# Patient Record
Sex: FEMALE | Race: WHITE | NOT HISPANIC OR LATINO
[De-identification: names, ages, dates, MRNs, and addresses within clinical notes are randomized per-mention and may not be internally consistent; named-entity substitution may affect disease eponyms.]

---

## 2017-04-25 ENCOUNTER — APPOINTMENT (OUTPATIENT)
Dept: OBGYN | Facility: CLINIC | Age: 72
End: 2017-04-25
Payer: COMMERCIAL

## 2017-04-25 VITALS
SYSTOLIC BLOOD PRESSURE: 113 MMHG | HEIGHT: 64 IN | BODY MASS INDEX: 28.68 KG/M2 | DIASTOLIC BLOOD PRESSURE: 71 MMHG | HEART RATE: 60 BPM | WEIGHT: 168 LBS

## 2017-04-25 PROCEDURE — G0101: CPT

## 2017-04-25 PROCEDURE — 99397 PER PM REEVAL EST PAT 65+ YR: CPT

## 2017-04-26 LAB — HPV HIGH+LOW RISK DNA PNL CVX: NEGATIVE

## 2017-05-01 LAB — CYTOLOGY CVX/VAG DOC THIN PREP: NORMAL

## 2018-05-01 ENCOUNTER — APPOINTMENT (OUTPATIENT)
Dept: OBGYN | Facility: CLINIC | Age: 73
End: 2018-05-01

## 2018-05-02 ENCOUNTER — RESULT REVIEW (OUTPATIENT)
Age: 73
End: 2018-05-02

## 2018-05-08 ENCOUNTER — APPOINTMENT (OUTPATIENT)
Dept: OBGYN | Facility: CLINIC | Age: 73
End: 2018-05-08
Payer: MEDICARE

## 2018-05-08 VITALS
BODY MASS INDEX: 27.66 KG/M2 | SYSTOLIC BLOOD PRESSURE: 102 MMHG | HEIGHT: 64 IN | DIASTOLIC BLOOD PRESSURE: 58 MMHG | WEIGHT: 162 LBS | HEART RATE: 65 BPM | OXYGEN SATURATION: 98 % | RESPIRATION RATE: 16 BRPM

## 2018-05-08 PROCEDURE — 99397 PER PM REEVAL EST PAT 65+ YR: CPT

## 2018-05-09 LAB
C TRACH RRNA SPEC QL NAA+PROBE: NOT DETECTED
N GONORRHOEA RRNA SPEC QL NAA+PROBE: NOT DETECTED
SOURCE TP AMPLIFICATION: NORMAL

## 2018-05-14 LAB — CYTOLOGY CVX/VAG DOC THIN PREP: NORMAL

## 2018-06-26 ENCOUNTER — OTHER (OUTPATIENT)
Age: 73
End: 2018-06-26

## 2018-06-29 ENCOUNTER — OTHER (OUTPATIENT)
Age: 73
End: 2018-06-29

## 2018-07-02 ENCOUNTER — OTHER (OUTPATIENT)
Age: 73
End: 2018-07-02

## 2018-07-03 ENCOUNTER — OTHER (OUTPATIENT)
Age: 73
End: 2018-07-03

## 2018-07-03 ENCOUNTER — RESULT REVIEW (OUTPATIENT)
Age: 73
End: 2018-07-03

## 2019-05-21 ENCOUNTER — APPOINTMENT (OUTPATIENT)
Dept: OBGYN | Facility: CLINIC | Age: 74
End: 2019-05-21
Payer: MEDICARE

## 2019-05-21 VITALS
BODY MASS INDEX: 28.85 KG/M2 | DIASTOLIC BLOOD PRESSURE: 58 MMHG | HEART RATE: 61 BPM | WEIGHT: 169 LBS | SYSTOLIC BLOOD PRESSURE: 101 MMHG | HEIGHT: 64 IN

## 2019-05-21 PROCEDURE — 99397 PER PM REEVAL EST PAT 65+ YR: CPT

## 2019-05-21 NOTE — HISTORY OF PRESENT ILLNESS
[Good] : being in good health [Last Mammogram ___] : Last Mammogram was [unfilled] [Last Bone Density ___] : Last bone density studies [unfilled] [Last Colonoscopy ___] : Last colonoscopy [unfilled] [Last Pap ___] : Last cervical pap smear was [unfilled]

## 2020-07-24 ENCOUNTER — APPOINTMENT (OUTPATIENT)
Dept: OBGYN | Facility: CLINIC | Age: 75
End: 2020-07-24
Payer: MEDICARE

## 2020-07-24 VITALS
HEIGHT: 64 IN | HEART RATE: 72 BPM | DIASTOLIC BLOOD PRESSURE: 70 MMHG | TEMPERATURE: 97.2 F | SYSTOLIC BLOOD PRESSURE: 100 MMHG | BODY MASS INDEX: 26.98 KG/M2 | OXYGEN SATURATION: 98 % | WEIGHT: 158 LBS

## 2020-07-24 PROCEDURE — 99397 PER PM REEVAL EST PAT 65+ YR: CPT

## 2020-07-24 NOTE — CHIEF COMPLAINT
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

## 2020-07-24 NOTE — PHYSICAL EXAM
[Awake] : awake [Alert] : alert [Soft] : soft [Oriented x3] : oriented to person, place, and time [Normal] : uterus [No Bleeding] : there was no active vaginal bleeding [Atrophy] : atrophy [Uterine Adnexae] : were not tender and not enlarged [Acute Distress] : no acute distress [Mass] : no breast mass [Axillary LAD] : no axillary lymphadenopathy [Nipple Discharge] : no nipple discharge [Tender] : non tender

## 2020-07-24 NOTE — HISTORY OF PRESENT ILLNESS
[Good] : being in good health [Last Colonoscopy ___] : Last colonoscopy [unfilled] [Last Pap ___] : Last cervical pap smear was [unfilled] [Postmenopausal] : is postmenopausal [Last Mammogram ___] : Last Mammogram was [unfilled] [HPV Vaccine NA Due to Age] : HPV vaccine not available to patient due to age

## 2020-07-26 LAB — HPV HIGH+LOW RISK DNA PNL CVX: NOT DETECTED

## 2020-07-30 LAB — CYTOLOGY CVX/VAG DOC THIN PREP: ABNORMAL

## 2020-08-14 ENCOUNTER — TRANSCRIPTION ENCOUNTER (OUTPATIENT)
Age: 75
End: 2020-08-14

## 2020-10-01 ENCOUNTER — APPOINTMENT (OUTPATIENT)
Dept: SURGERY | Facility: CLINIC | Age: 75
End: 2020-10-01

## 2021-08-02 ENCOUNTER — APPOINTMENT (OUTPATIENT)
Dept: OBGYN | Facility: CLINIC | Age: 76
End: 2021-08-02
Payer: MEDICARE

## 2021-08-02 VITALS
TEMPERATURE: 96 F | WEIGHT: 155 LBS | BODY MASS INDEX: 26.46 KG/M2 | DIASTOLIC BLOOD PRESSURE: 57 MMHG | HEART RATE: 68 BPM | SYSTOLIC BLOOD PRESSURE: 95 MMHG | HEIGHT: 64 IN

## 2021-08-02 PROCEDURE — 99397 PER PM REEVAL EST PAT 65+ YR: CPT

## 2021-08-02 RX ORDER — CHLORHEXIDINE GLUCONATE, 0.12% ORAL RINSE 1.2 MG/ML
0.12 SOLUTION DENTAL
Qty: 946 | Refills: 0 | Status: COMPLETED | COMMUNITY
Start: 2017-01-23 | End: 2021-08-02

## 2021-08-02 NOTE — HISTORY OF PRESENT ILLNESS
[Good] : being in good health [Last Mammogram ___] : Last Mammogram was [unfilled] [Last Colonoscopy ___] : Last colonoscopy [unfilled] [Last Pap ___] : Last cervical pap smear was [unfilled] [Postmenopausal] : is postmenopausal [HPV Vaccine NA Due to Age] : HPV vaccine not available to patient due to age [Mammogramdate] : 2021 [BreastSonogramDate] : 2021 [PapSmeardate] : 2020 [BoneDensityDate] : 2018 [ColonoscopyDate] : 2011

## 2021-09-03 ENCOUNTER — NON-APPOINTMENT (OUTPATIENT)
Age: 76
End: 2021-09-03

## 2021-09-04 ENCOUNTER — TRANSCRIPTION ENCOUNTER (OUTPATIENT)
Age: 76
End: 2021-09-04

## 2021-09-17 NOTE — PHYSICAL EXAM
[Appropriately responsive] : appropriately responsive [Alert] : alert [No Acute Distress] : no acute distress [No Lymphadenopathy] : no lymphadenopathy [Soft] : soft [Non-tender] : non-tender [Non-distended] : non-distended [No HSM] : No HSM [No Lesions] : no lesions [No Mass] : no mass [Oriented x3] : oriented x3 [Labia Majora] : normal [Labia Minora] : normal [Normal] : normal [Uterine Adnexae] : normal

## 2021-09-17 NOTE — COUNSELING
[Nutrition/ Exercise/ Weight Management] : nutrition, exercise, weight management [Breast Self Exam] : breast self exam [Vitamins/Supplements] : vitamins/supplements [Pre/Post Op Instructions] : pre/post op instructions

## 2021-09-23 ENCOUNTER — APPOINTMENT (OUTPATIENT)
Dept: OBGYN | Facility: CLINIC | Age: 76
End: 2021-09-23
Payer: MEDICARE

## 2021-09-23 ENCOUNTER — ASOB RESULT (OUTPATIENT)
Age: 76
End: 2021-09-23

## 2021-09-23 PROCEDURE — 99214 OFFICE O/P EST MOD 30 MIN: CPT | Mod: 25

## 2021-09-23 PROCEDURE — 76831 ECHO EXAM UTERUS: CPT

## 2021-09-23 PROCEDURE — 58340 CATHETER FOR HYSTEROGRAPHY: CPT

## 2021-10-08 ENCOUNTER — OUTPATIENT (OUTPATIENT)
Dept: OUTPATIENT SERVICES | Facility: HOSPITAL | Age: 76
LOS: 1 days | End: 2021-10-08
Payer: MEDICARE

## 2021-10-08 VITALS
RESPIRATION RATE: 14 BRPM | HEIGHT: 64 IN | OXYGEN SATURATION: 98 % | WEIGHT: 154.98 LBS | TEMPERATURE: 98 F | DIASTOLIC BLOOD PRESSURE: 62 MMHG | HEART RATE: 58 BPM | SYSTOLIC BLOOD PRESSURE: 108 MMHG

## 2021-10-08 DIAGNOSIS — Z98.890 OTHER SPECIFIED POSTPROCEDURAL STATES: Chronic | ICD-10-CM

## 2021-10-08 DIAGNOSIS — Z90.89 ACQUIRED ABSENCE OF OTHER ORGANS: Chronic | ICD-10-CM

## 2021-10-08 DIAGNOSIS — Z91.89 OTHER SPECIFIED PERSONAL RISK FACTORS, NOT ELSEWHERE CLASSIFIED: ICD-10-CM

## 2021-10-08 DIAGNOSIS — I10 ESSENTIAL (PRIMARY) HYPERTENSION: ICD-10-CM

## 2021-10-08 DIAGNOSIS — N84.0 POLYP OF CORPUS UTERI: ICD-10-CM

## 2021-10-08 DIAGNOSIS — C50.919 MALIGNANT NEOPLASM OF UNSPECIFIED SITE OF UNSPECIFIED FEMALE BREAST: ICD-10-CM

## 2021-10-08 LAB
ALBUMIN SERPL ELPH-MCNC: 4.8 G/DL — SIGNIFICANT CHANGE UP (ref 3.3–5)
ALP SERPL-CCNC: 75 U/L — SIGNIFICANT CHANGE UP (ref 40–120)
ALT FLD-CCNC: 11 U/L — SIGNIFICANT CHANGE UP (ref 4–33)
ANION GAP SERPL CALC-SCNC: 14 MMOL/L — SIGNIFICANT CHANGE UP (ref 7–14)
AST SERPL-CCNC: 12 U/L — SIGNIFICANT CHANGE UP (ref 4–32)
BILIRUB SERPL-MCNC: 0.6 MG/DL — SIGNIFICANT CHANGE UP (ref 0.2–1.2)
BUN SERPL-MCNC: 25 MG/DL — HIGH (ref 7–23)
CALCIUM SERPL-MCNC: 9.8 MG/DL — SIGNIFICANT CHANGE UP (ref 8.4–10.5)
CHLORIDE SERPL-SCNC: 104 MMOL/L — SIGNIFICANT CHANGE UP (ref 98–107)
CO2 SERPL-SCNC: 23 MMOL/L — SIGNIFICANT CHANGE UP (ref 22–31)
CREAT SERPL-MCNC: 0.68 MG/DL — SIGNIFICANT CHANGE UP (ref 0.5–1.3)
GLUCOSE SERPL-MCNC: 99 MG/DL — SIGNIFICANT CHANGE UP (ref 70–99)
POTASSIUM SERPL-MCNC: 4.2 MMOL/L — SIGNIFICANT CHANGE UP (ref 3.5–5.3)
POTASSIUM SERPL-SCNC: 4.2 MMOL/L — SIGNIFICANT CHANGE UP (ref 3.5–5.3)
PROT SERPL-MCNC: 7.3 G/DL — SIGNIFICANT CHANGE UP (ref 6–8.3)
SODIUM SERPL-SCNC: 141 MMOL/L — SIGNIFICANT CHANGE UP (ref 135–145)

## 2021-10-08 PROCEDURE — 93010 ELECTROCARDIOGRAM REPORT: CPT

## 2021-10-08 RX ORDER — SODIUM CHLORIDE 9 MG/ML
1000 INJECTION, SOLUTION INTRAVENOUS
Refills: 0 | Status: DISCONTINUED | OUTPATIENT
Start: 2021-10-26 | End: 2021-11-09

## 2021-10-08 NOTE — H&P PST ADULT - NSICDXPASTMEDICALHX_GEN_ALL_CORE_FT
PAST MEDICAL HISTORY:  Abnormal Pap smear of cervix     Breast cancer dx 2016    HLD (hyperlipidemia)     HTN (hypertension)     Renal calculi 5 yrs ago     PAST MEDICAL HISTORY:  Abnormal Pap smear of cervix     Breast cancer dx 2016    HLD (hyperlipidemia)     HTN (hypertension)     Polyp of corpus uteri     Renal calculi 5 yrs ago

## 2021-10-08 NOTE — H&P PST ADULT - NSANTHOSAYNRD_GEN_A_CORE
No. ELVIE screening performed.  STOP BANG Legend: 0-2 = LOW Risk; 3-4 = INTERMEDIATE Risk; 5-8 = HIGH Risk

## 2021-10-08 NOTE — H&P PST ADULT - VENOUS THROMBOEMBOLISM CURRENT STATUS
(1) other risk factor (includes escalating BMI, pack-years of smoking, diabetes requiring insulin, chemotherapy, female gender and length of surgery)/(2) malignancy (present or previous) (1) varicose veins/(1) other risk factor (includes escalating BMI, pack-years of smoking, diabetes requiring insulin, chemotherapy, female gender and length of surgery)/(2) malignancy (present or previous)

## 2021-10-08 NOTE — H&P PST ADULT - PROBLEM SELECTOR PLAN 2
Assessment and Plan: Patient instructed to take coreg and ramipril on day of procedure, verbalized understanding.

## 2021-10-08 NOTE — H&P PST ADULT - PROBLEM SELECTOR PLAN 1
Assessment and Plan: Patient scheduled for surgery on 10/26/21  Patient provided with verbal and written presurgical instructions; verbalized understanding  with teach back.    Patient provided with famotidine for GI prophylaxis; verbalized understanding.     Patient instructed to call surgeon to schedule COVID appointment     Cardiac evaluation requested by PST for abnormal EKG, patient verbalized understanding, will obtain  comparison EKG requested for abnormal EKG

## 2021-10-08 NOTE — H&P PST ADULT - NEGATIVE NEUROLOGICAL SYMPTOMS
no weakness/no paresthesias/no generalized seizures/no vertigo/no loss of sensation/no difficulty walking/no headache/no hemiparesis

## 2021-10-08 NOTE — H&P PST ADULT - HISTORY OF PRESENT ILLNESS
76 year old female presents with preop dx polyp of corpus uteri scheduled for dilation curettage, hysteroscopy with symphion. Patient reported GYN noted polyp on vaginal sono. denies bleeding or spotting  76 year old female presents with preop dx polyp of corpus uteri scheduled for dilation curettage, hysteroscopy with symphion. Patient reported GYN noted polyp on vaginal sono. denies bleeding or spotting, pelvic fullness or pain

## 2021-10-08 NOTE — H&P PST ADULT - ATTENDING COMMENTS
plan EUA,D/C hysteroscopy,polypectomy--pt aware R/B/a,side effects,compls. Ample time for questions provided

## 2021-10-08 NOTE — H&P PST ADULT - NSICDXFAMILYHX_GEN_ALL_CORE_FT
FAMILY HISTORY:  Father  Still living? Unknown  FH: heart disease, Age at diagnosis: Age Unknown    Mother  Still living? No  Family history of DVT, Age at diagnosis: Age Unknown  FH: heart disease, Age at diagnosis: Age Unknown  FH: type 2 diabetes, Age at diagnosis: Age Unknown    Sibling  Still living? No  FH: heart disease, Age at diagnosis: Age Unknown

## 2021-10-15 ENCOUNTER — TRANSCRIPTION ENCOUNTER (OUTPATIENT)
Age: 76
End: 2021-10-15

## 2021-10-23 ENCOUNTER — APPOINTMENT (OUTPATIENT)
Dept: DISASTER EMERGENCY | Facility: CLINIC | Age: 76
End: 2021-10-23

## 2021-10-24 LAB — SARS-COV-2 N GENE NPH QL NAA+PROBE: NOT DETECTED

## 2021-10-25 ENCOUNTER — TRANSCRIPTION ENCOUNTER (OUTPATIENT)
Age: 76
End: 2021-10-25

## 2021-10-25 NOTE — ASU PATIENT PROFILE, ADULT - NSICDXPASTMEDICALHX_GEN_ALL_CORE_FT
PAST MEDICAL HISTORY:  Abnormal Pap smear of cervix     Breast cancer dx 2016    HLD (hyperlipidemia)     HTN (hypertension)     Polyp of corpus uteri     Renal calculi 5 yrs ago

## 2021-10-26 ENCOUNTER — APPOINTMENT (OUTPATIENT)
Dept: OBGYN | Facility: HOSPITAL | Age: 76
End: 2021-10-26

## 2021-10-26 ENCOUNTER — RESULT REVIEW (OUTPATIENT)
Age: 76
End: 2021-10-26

## 2021-10-26 ENCOUNTER — OUTPATIENT (OUTPATIENT)
Dept: OUTPATIENT SERVICES | Facility: HOSPITAL | Age: 76
LOS: 1 days | Discharge: HOME | End: 2021-10-26
Payer: MEDICARE

## 2021-10-26 VITALS
HEART RATE: 63 BPM | HEIGHT: 64 IN | DIASTOLIC BLOOD PRESSURE: 43 MMHG | TEMPERATURE: 98 F | OXYGEN SATURATION: 99 % | RESPIRATION RATE: 16 BRPM | WEIGHT: 154.98 LBS | SYSTOLIC BLOOD PRESSURE: 120 MMHG

## 2021-10-26 VITALS
HEART RATE: 65 BPM | OXYGEN SATURATION: 100 % | SYSTOLIC BLOOD PRESSURE: 107 MMHG | DIASTOLIC BLOOD PRESSURE: 88 MMHG | RESPIRATION RATE: 19 BRPM

## 2021-10-26 DIAGNOSIS — N84.0 POLYP OF CORPUS UTERI: ICD-10-CM

## 2021-10-26 DIAGNOSIS — Z90.89 ACQUIRED ABSENCE OF OTHER ORGANS: Chronic | ICD-10-CM

## 2021-10-26 DIAGNOSIS — Z98.890 OTHER SPECIFIED POSTPROCEDURAL STATES: Chronic | ICD-10-CM

## 2021-10-26 PROCEDURE — 58558 HYSTEROSCOPY BIOPSY: CPT | Mod: GC

## 2021-10-26 PROCEDURE — 88305 TISSUE EXAM BY PATHOLOGIST: CPT | Mod: 26

## 2021-10-26 NOTE — ASU DISCHARGE PLAN (ADULT/PEDIATRIC) - ASU DC SPECIAL INSTRUCTIONSFT
Return to your regular way of eating.  Resume normal activity as tolerated, but no heavy lifting or strenuous activity for 2 weeks.  No driving for next 2 weeks and/or while on narcotic pain medication.  Complete vaginal rest, no tampons, no douching, no tub bathing, no sexual activities for 2 weeks unless otherwise instructed by your doctor.  Call your doctor with any signs and symptoms of infection such as fever, chills, nausea or vomiting.  Call your doctor with redness or swelling at the incision site, fluid leakage or wound separation.  Call your doctor if you're unable to tolerate food or have difficulty urinating.  Call your doctor if you have pain that is not relieved by your prescribed medications.  Notify your doctor with any other concerns.  Follow up with Dr. Hill as instructed. Return to your regular way of eating.  Resume normal activity as tolerated, but no heavy lifting or strenuous activity for 2 weeks.  No driving for next 2 weeks and/or while on narcotic pain medication.  Complete vaginal rest, no tampons, no douching, no tub bathing, no sexual activities for 2 weeks unless otherwise instructed by your doctor.  Call your doctor with any signs and symptoms of infection such as fever, chills, nausea or vomiting.  Call your doctor with redness or swelling at the incision site, fluid leakage or wound separation.  Call your doctor if you're unable to tolerate food or have difficulty urinating.  Call your doctor if you have pain that is not relieved by your prescribed medications.  Notify your doctor with any other concerns.  Follow up with Dr. Hill in 6-8 weeks.

## 2021-10-26 NOTE — ASU DISCHARGE PLAN (ADULT/PEDIATRIC) - NURSING INSTRUCTIONS
You received IV Tylenol for pain management at _10:30 am__. Please DO NOT take any Tylenol (Acetaminophen) containing products, such as Vicodin, Percocet, Excedrin, and cold medications for the next 6 hours (until __4:30_ PM). DO NOT TAKE MORE THAN 3000 MG OF TYLENOL in a 24 hour period.   You received IV Toradol for pain management at _10:30am__. Please DO NOT take Motrin/Ibuprofen/Advil/Aleve/NSAIDs (Non-Steroidal Anti-Inflammatory Drugs) for the next 6 hours (until4:30 ___ PM).

## 2021-10-26 NOTE — ASU DISCHARGE PLAN (ADULT/PEDIATRIC) - CARE PROVIDER_API CALL
Dina Hill)  Obstetrics and Gynecology  [address] [place] [address], [address]  [place], [place] [id number]  Phone: [phone]  Fax: [phone]  Follow Up Time: 2 weeks   Dina Hill)  Obstetrics and Gynecology  [address] [place] [address], [address]  [place], [place] [id number]  Phone: [phone]  Fax: [phone]  Follow Up Time:

## 2021-10-26 NOTE — BRIEF OPERATIVE NOTE - OPERATION/FINDINGS
Exam under anesthesia revealed vaginal atrophy, cervical stenosis and retroverted uterus 6 weeks in size. The uterine cavity was visualized via hysteroscopy and noted to be globally atrophic with 2 cm endometrial polypoid lesion noted on anterior uterine wall. The ostia were located and noted to be within normal limits bilaterally. Cervix dilated to 7.5mm and symphion device used to remove endometrial polypoid tissue, sent to pathology.    Dictation #: [id number]

## 2021-10-26 NOTE — ASU DISCHARGE PLAN (ADULT/PEDIATRIC) - CALL YOUR DOCTOR IF YOU HAVE ANY OF THE FOLLOWING:
Bleeding that does not stop/Fever greater than (need to indicate Fahrenheit or Celsius)/Nausea and vomiting that does not stop/Unable to urinate/Excessive diarrhea/Inability to tolerate liquids or foods/Increased irritability or sluggishness

## 2021-10-28 ENCOUNTER — NON-APPOINTMENT (OUTPATIENT)
Age: 76
End: 2021-10-28

## 2021-10-28 LAB — SURGICAL PATHOLOGY STUDY: SIGNIFICANT CHANGE UP

## 2021-11-01 ENCOUNTER — NON-APPOINTMENT (OUTPATIENT)
Age: 76
End: 2021-11-01

## 2021-11-01 ENCOUNTER — APPOINTMENT (OUTPATIENT)
Dept: CARDIOLOGY | Facility: CLINIC | Age: 76
End: 2021-11-01
Payer: MEDICARE

## 2021-11-01 VITALS
HEIGHT: 64 IN | HEART RATE: 64 BPM | WEIGHT: 155 LBS | BODY MASS INDEX: 26.46 KG/M2 | OXYGEN SATURATION: 100 % | DIASTOLIC BLOOD PRESSURE: 76 MMHG | SYSTOLIC BLOOD PRESSURE: 146 MMHG

## 2021-11-01 PROCEDURE — 99204 OFFICE O/P NEW MOD 45 MIN: CPT

## 2021-11-01 PROCEDURE — 93000 ELECTROCARDIOGRAM COMPLETE: CPT

## 2021-11-01 NOTE — REASON FOR VISIT
[FreeTextEntry1] : 75 yo transferring care to me and looking for internist.\par hx of breast CA - 6 y ago - had lumpectomy - no need for chemo or XRT.  ( followed by shahla zuleta , surgeon was dirici)\par \par Told of HTN - started on carvedolil and ramipril. 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 To to be honored as woman of distinction.\par \par  since 2001.\par \par \par Some recentl episodes of RIVERA when walking , when visiting daughter in [place].\par \par No hx of syncope, CP.\par \par \par \par

## 2021-11-01 NOTE — PHYSICAL EXAM
[Normal S1, S2] : normal S1, S2 [No Rub] : no rub [No Gallop] : no gallop [Normal] : moves all extremities, no focal deficits, normal speech [de-identified] : soft HSM at SB

## 2021-11-04 PROBLEM — E78.5 HYPERLIPIDEMIA, UNSPECIFIED: Chronic | Status: ACTIVE | Noted: 2021-10-08

## 2021-11-04 PROBLEM — N84.0 POLYP OF CORPUS UTERI: Chronic | Status: ACTIVE | Noted: 2021-10-08

## 2021-11-04 PROBLEM — C50.919 MALIGNANT NEOPLASM OF UNSPECIFIED SITE OF UNSPECIFIED FEMALE BREAST: Chronic | Status: ACTIVE | Noted: 2021-10-08

## 2021-11-04 PROBLEM — I10 ESSENTIAL (PRIMARY) HYPERTENSION: Chronic | Status: ACTIVE | Noted: 2021-10-08

## 2021-11-15 ENCOUNTER — NON-APPOINTMENT (OUTPATIENT)
Age: 76
End: 2021-11-15

## 2021-11-16 LAB
ALBUMIN SERPL ELPH-MCNC: 4.6 G/DL
ALP BLD-CCNC: 76 U/L
ALT SERPL-CCNC: 14 U/L
ANION GAP SERPL CALC-SCNC: 13 MMOL/L
AST SERPL-CCNC: 13 U/L
BASOPHILS # BLD AUTO: 0.05 K/UL
BASOPHILS NFR BLD AUTO: 0.7 %
BILIRUB SERPL-MCNC: 0.8 MG/DL
BUN SERPL-MCNC: 25 MG/DL
CALCIUM SERPL-MCNC: 10 MG/DL
CHLORIDE SERPL-SCNC: 109 MMOL/L
CHOLEST SERPL-MCNC: 123 MG/DL
CO2 SERPL-SCNC: 23 MMOL/L
CREAT SERPL-MCNC: 0.69 MG/DL
EOSINOPHIL # BLD AUTO: 0.19 K/UL
EOSINOPHIL NFR BLD AUTO: 2.8 %
ESTIMATED AVERAGE GLUCOSE: 126 MG/DL
GLUCOSE SERPL-MCNC: 108 MG/DL
HBA1C MFR BLD HPLC: 6 %
HCT VFR BLD CALC: 38.7 %
HDLC SERPL-MCNC: 46 MG/DL
HGB BLD-MCNC: 12.6 G/DL
IMM GRANULOCYTES NFR BLD AUTO: 0.3 %
LDLC SERPL CALC-MCNC: 59 MG/DL
LYMPHOCYTES # BLD AUTO: 1.75 K/UL
LYMPHOCYTES NFR BLD AUTO: 25.8 %
MAN DIFF?: NORMAL
MCHC RBC-ENTMCNC: 28.9 PG
MCHC RBC-ENTMCNC: 32.6 GM/DL
MCV RBC AUTO: 88.8 FL
MONOCYTES # BLD AUTO: 0.67 K/UL
MONOCYTES NFR BLD AUTO: 9.9 %
NEUTROPHILS # BLD AUTO: 4.09 K/UL
NEUTROPHILS NFR BLD AUTO: 60.5 %
NONHDLC SERPL-MCNC: 78 MG/DL
PLATELET # BLD AUTO: 308 K/UL
POTASSIUM SERPL-SCNC: 5 MMOL/L
PROT SERPL-MCNC: 6.8 G/DL
RBC # BLD: 4.36 M/UL
RBC # FLD: 13.3 %
SODIUM SERPL-SCNC: 144 MMOL/L
TRIGL SERPL-MCNC: 94 MG/DL
TSH SERPL-ACNC: 0.31 UIU/ML
WBC # FLD AUTO: 6.77 K/UL

## 2021-12-03 ENCOUNTER — APPOINTMENT (OUTPATIENT)
Dept: CV DIAGNOSTICS | Facility: HOSPITAL | Age: 76
End: 2021-12-03

## 2021-12-03 ENCOUNTER — OUTPATIENT (OUTPATIENT)
Dept: OUTPATIENT SERVICES | Facility: HOSPITAL | Age: 76
LOS: 1 days | End: 2021-12-03
Payer: COMMERCIAL

## 2021-12-03 ENCOUNTER — APPOINTMENT (OUTPATIENT)
Dept: CV DIAGNOSITCS | Facility: HOSPITAL | Age: 76
End: 2021-12-03

## 2021-12-03 DIAGNOSIS — Z98.890 OTHER SPECIFIED POSTPROCEDURAL STATES: Chronic | ICD-10-CM

## 2021-12-03 DIAGNOSIS — Z90.89 ACQUIRED ABSENCE OF OTHER ORGANS: Chronic | ICD-10-CM

## 2021-12-03 DIAGNOSIS — I10 ESSENTIAL (PRIMARY) HYPERTENSION: ICD-10-CM

## 2021-12-03 PROCEDURE — A9500: CPT

## 2021-12-03 PROCEDURE — 93306 TTE W/DOPPLER COMPLETE: CPT

## 2021-12-03 PROCEDURE — 78452 HT MUSCLE IMAGE SPECT MULT: CPT | Mod: MH

## 2021-12-03 PROCEDURE — 93306 TTE W/DOPPLER COMPLETE: CPT | Mod: 26

## 2021-12-03 PROCEDURE — 93018 CV STRESS TEST I&R ONLY: CPT

## 2021-12-03 PROCEDURE — 93017 CV STRESS TEST TRACING ONLY: CPT

## 2021-12-03 PROCEDURE — 78452 HT MUSCLE IMAGE SPECT MULT: CPT | Mod: 26,MH

## 2021-12-03 PROCEDURE — 93016 CV STRESS TEST SUPVJ ONLY: CPT

## 2021-12-15 ENCOUNTER — APPOINTMENT (OUTPATIENT)
Dept: INTERNAL MEDICINE | Facility: CLINIC | Age: 76
End: 2021-12-15

## 2021-12-16 ENCOUNTER — APPOINTMENT (OUTPATIENT)
Dept: OBGYN | Facility: CLINIC | Age: 76
End: 2021-12-16
Payer: MEDICARE

## 2021-12-16 VITALS
DIASTOLIC BLOOD PRESSURE: 68 MMHG | SYSTOLIC BLOOD PRESSURE: 132 MMHG | BODY MASS INDEX: 26.8 KG/M2 | HEIGHT: 64 IN | HEART RATE: 67 BPM | WEIGHT: 157 LBS

## 2021-12-16 DIAGNOSIS — N84.0 POLYP OF CORPUS UTERI: ICD-10-CM

## 2021-12-16 PROCEDURE — 99213 OFFICE O/P EST LOW 20 MIN: CPT

## 2021-12-22 ENCOUNTER — APPOINTMENT (OUTPATIENT)
Dept: INTERNAL MEDICINE | Facility: CLINIC | Age: 76
End: 2021-12-22

## 2022-01-14 ENCOUNTER — OUTPATIENT (OUTPATIENT)
Dept: OUTPATIENT SERVICES | Facility: HOSPITAL | Age: 77
LOS: 1 days | End: 2022-01-14
Payer: COMMERCIAL

## 2022-01-14 ENCOUNTER — APPOINTMENT (OUTPATIENT)
Dept: CT IMAGING | Facility: CLINIC | Age: 77
End: 2022-01-14
Payer: MEDICARE

## 2022-01-14 DIAGNOSIS — I10 ESSENTIAL (PRIMARY) HYPERTENSION: ICD-10-CM

## 2022-01-14 DIAGNOSIS — Z90.89 ACQUIRED ABSENCE OF OTHER ORGANS: Chronic | ICD-10-CM

## 2022-01-14 DIAGNOSIS — E78.5 HYPERLIPIDEMIA, UNSPECIFIED: ICD-10-CM

## 2022-01-14 DIAGNOSIS — Z98.890 OTHER SPECIFIED POSTPROCEDURAL STATES: Chronic | ICD-10-CM

## 2022-01-14 PROCEDURE — 75574 CT ANGIO HRT W/3D IMAGE: CPT

## 2022-01-14 PROCEDURE — 75574 CT ANGIO HRT W/3D IMAGE: CPT | Mod: 26

## 2022-01-14 PROCEDURE — 82565 ASSAY OF CREATININE: CPT

## 2022-01-23 ENCOUNTER — NON-APPOINTMENT (OUTPATIENT)
Age: 77
End: 2022-01-23

## 2022-01-25 ENCOUNTER — INPATIENT (INPATIENT)
Facility: HOSPITAL | Age: 77
LOS: 0 days | Discharge: HOME | DRG: 247 | End: 2022-01-26
Attending: INTERNAL MEDICINE | Admitting: INTERNAL MEDICINE
Payer: COMMERCIAL

## 2022-01-25 ENCOUNTER — TRANSCRIPTION ENCOUNTER (OUTPATIENT)
Age: 77
End: 2022-01-25

## 2022-01-25 VITALS
HEART RATE: 60 BPM | WEIGHT: 154.1 LBS | SYSTOLIC BLOOD PRESSURE: 114 MMHG | HEIGHT: 64 IN | OXYGEN SATURATION: 100 % | DIASTOLIC BLOOD PRESSURE: 56 MMHG | RESPIRATION RATE: 16 BRPM | TEMPERATURE: 98 F

## 2022-01-25 DIAGNOSIS — I20.0 UNSTABLE ANGINA: ICD-10-CM

## 2022-01-25 DIAGNOSIS — Z90.89 ACQUIRED ABSENCE OF OTHER ORGANS: Chronic | ICD-10-CM

## 2022-01-25 DIAGNOSIS — Z98.890 OTHER SPECIFIED POSTPROCEDURAL STATES: Chronic | ICD-10-CM

## 2022-01-25 LAB
ALBUMIN SERPL ELPH-MCNC: 4.5 G/DL — SIGNIFICANT CHANGE UP (ref 3.3–5)
ALP SERPL-CCNC: 69 U/L — SIGNIFICANT CHANGE UP (ref 40–120)
ALT FLD-CCNC: 11 U/L — SIGNIFICANT CHANGE UP (ref 10–45)
ANION GAP SERPL CALC-SCNC: 12 MMOL/L — SIGNIFICANT CHANGE UP (ref 5–17)
AST SERPL-CCNC: 14 U/L — SIGNIFICANT CHANGE UP (ref 10–40)
BILIRUB SERPL-MCNC: 0.7 MG/DL — SIGNIFICANT CHANGE UP (ref 0.2–1.2)
BUN SERPL-MCNC: 23 MG/DL — SIGNIFICANT CHANGE UP (ref 7–23)
CALCIUM SERPL-MCNC: 10.1 MG/DL — SIGNIFICANT CHANGE UP (ref 8.4–10.5)
CHLORIDE SERPL-SCNC: 104 MMOL/L — SIGNIFICANT CHANGE UP (ref 96–108)
CO2 SERPL-SCNC: 23 MMOL/L — SIGNIFICANT CHANGE UP (ref 22–31)
CREAT SERPL-MCNC: 0.85 MG/DL — SIGNIFICANT CHANGE UP (ref 0.5–1.3)
GLUCOSE SERPL-MCNC: 101 MG/DL — HIGH (ref 70–99)
HCT VFR BLD CALC: 39.6 % — SIGNIFICANT CHANGE UP (ref 34.5–45)
HGB BLD-MCNC: 12.9 G/DL — SIGNIFICANT CHANGE UP (ref 11.5–15.5)
MCHC RBC-ENTMCNC: 28.4 PG — SIGNIFICANT CHANGE UP (ref 27–34)
MCHC RBC-ENTMCNC: 32.6 GM/DL — SIGNIFICANT CHANGE UP (ref 32–36)
MCV RBC AUTO: 87.2 FL — SIGNIFICANT CHANGE UP (ref 80–100)
NRBC # BLD: 0 /100 WBCS — SIGNIFICANT CHANGE UP (ref 0–0)
PLATELET # BLD AUTO: 323 K/UL — SIGNIFICANT CHANGE UP (ref 150–400)
POTASSIUM SERPL-MCNC: 4.7 MMOL/L — SIGNIFICANT CHANGE UP (ref 3.5–5.3)
POTASSIUM SERPL-SCNC: 4.7 MMOL/L — SIGNIFICANT CHANGE UP (ref 3.5–5.3)
PROT SERPL-MCNC: 7.1 G/DL — SIGNIFICANT CHANGE UP (ref 6–8.3)
RBC # BLD: 4.54 M/UL — SIGNIFICANT CHANGE UP (ref 3.8–5.2)
RBC # FLD: 13.1 % — SIGNIFICANT CHANGE UP (ref 10.3–14.5)
SODIUM SERPL-SCNC: 139 MMOL/L — SIGNIFICANT CHANGE UP (ref 135–145)
WBC # BLD: 7.2 K/UL — SIGNIFICANT CHANGE UP (ref 3.8–10.5)
WBC # FLD AUTO: 7.2 K/UL — SIGNIFICANT CHANGE UP (ref 3.8–10.5)

## 2022-01-25 PROCEDURE — 93010 ELECTROCARDIOGRAM REPORT: CPT

## 2022-01-25 PROCEDURE — 93454 CORONARY ARTERY ANGIO S&I: CPT | Mod: 26,59

## 2022-01-25 PROCEDURE — 92928 PRQ TCAT PLMT NTRAC ST 1 LES: CPT | Mod: RC

## 2022-01-25 RX ORDER — ATORVASTATIN CALCIUM 80 MG/1
40 TABLET, FILM COATED ORAL AT BEDTIME
Refills: 0 | Status: DISCONTINUED | OUTPATIENT
Start: 2022-01-25 | End: 2022-01-26

## 2022-01-25 RX ORDER — CHOLECALCIFEROL (VITAMIN D3) 125 MCG
1 CAPSULE ORAL
Qty: 0 | Refills: 0 | DISCHARGE

## 2022-01-25 RX ORDER — ANASTROZOLE 1 MG/1
1 TABLET ORAL DAILY
Refills: 0 | Status: DISCONTINUED | OUTPATIENT
Start: 2022-01-25 | End: 2022-01-26

## 2022-01-25 RX ORDER — CARVEDILOL PHOSPHATE 80 MG/1
1 CAPSULE, EXTENDED RELEASE ORAL
Qty: 0 | Refills: 0 | DISCHARGE

## 2022-01-25 RX ORDER — CLOPIDOGREL BISULFATE 75 MG/1
75 TABLET, FILM COATED ORAL DAILY
Refills: 0 | Status: DISCONTINUED | OUTPATIENT
Start: 2022-01-26 | End: 2022-01-26

## 2022-01-25 RX ORDER — ASPIRIN/CALCIUM CARB/MAGNESIUM 324 MG
81 TABLET ORAL DAILY
Refills: 0 | Status: DISCONTINUED | OUTPATIENT
Start: 2022-01-26 | End: 2022-01-26

## 2022-01-25 RX ORDER — ACETAMINOPHEN 500 MG
650 TABLET ORAL EVERY 6 HOURS
Refills: 0 | Status: DISCONTINUED | OUTPATIENT
Start: 2022-01-25 | End: 2022-01-26

## 2022-01-25 RX ORDER — SODIUM CHLORIDE 9 MG/ML
3 INJECTION INTRAMUSCULAR; INTRAVENOUS; SUBCUTANEOUS EVERY 8 HOURS
Refills: 0 | Status: DISCONTINUED | OUTPATIENT
Start: 2022-01-25 | End: 2022-01-26

## 2022-01-25 RX ORDER — CARVEDILOL PHOSPHATE 80 MG/1
6.25 CAPSULE, EXTENDED RELEASE ORAL EVERY 12 HOURS
Refills: 0 | Status: DISCONTINUED | OUTPATIENT
Start: 2022-01-25 | End: 2022-01-26

## 2022-01-25 RX ORDER — RAMIPRIL 5 MG
1 CAPSULE ORAL
Qty: 0 | Refills: 0 | DISCHARGE

## 2022-01-25 RX ORDER — CHOLECALCIFEROL (VITAMIN D3) 125 MCG
2000 CAPSULE ORAL DAILY
Refills: 0 | Status: DISCONTINUED | OUTPATIENT
Start: 2022-01-25 | End: 2022-01-26

## 2022-01-25 RX ORDER — ANASTROZOLE 1 MG/1
1 TABLET ORAL
Qty: 0 | Refills: 0 | DISCHARGE

## 2022-01-25 RX ORDER — LISINOPRIL 2.5 MG/1
40 TABLET ORAL DAILY
Refills: 0 | Status: DISCONTINUED | OUTPATIENT
Start: 2022-01-25 | End: 2022-01-26

## 2022-01-25 RX ADMIN — ANASTROZOLE 1 MILLIGRAM(S): 1 TABLET ORAL at 21:36

## 2022-01-25 RX ADMIN — CARVEDILOL PHOSPHATE 6.25 MILLIGRAM(S): 80 CAPSULE, EXTENDED RELEASE ORAL at 21:36

## 2022-01-25 RX ADMIN — SODIUM CHLORIDE 3 MILLILITER(S): 9 INJECTION INTRAMUSCULAR; INTRAVENOUS; SUBCUTANEOUS at 16:47

## 2022-01-25 RX ADMIN — Medication 650 MILLIGRAM(S): at 21:35

## 2022-01-25 RX ADMIN — Medication 650 MILLIGRAM(S): at 22:05

## 2022-01-25 RX ADMIN — ATORVASTATIN CALCIUM 40 MILLIGRAM(S): 80 TABLET, FILM COATED ORAL at 21:36

## 2022-01-25 RX ADMIN — SODIUM CHLORIDE 3 MILLILITER(S): 9 INJECTION INTRAMUSCULAR; INTRAVENOUS; SUBCUTANEOUS at 21:12

## 2022-01-25 NOTE — H&P CARDIOLOGY - HISTORY OF PRESENT ILLNESS
76 year old female with no implantable cardiac monitoring device and PMH of hard of hearing (bilateral hearing aids), htn, hld, breast cancer 2016 s/p right breast lumpectomy, presents today for cardiac catheterization. Patient reports  76 year old female with no implantable cardiac monitoring device and PMH of hard of hearing (bilateral hearing aids), htn, hld, breast cancer 2016 s/p right breast lumpectomy, and + family hx of CAD presents today for cardiac catheterization. Patient reports RIVERA over the past few months. No associated chest pain. Was evaluated by Dr Cherrie Mendoza where cardiac work up revealed-    TTE 12/3/2021   1. Mild-moderate mitral regurgitation.  2. Mild aortic regurgitation.  3. Normal left ventricular internal dimensions and wall thicknesses.  4. Normal left ventricular systolic function. No segmental wall motion abnormalities.  5. Reversal of the E-A  waves of the mitral inflow pattern is consistent with diastolic LV dysfunction.  6. Normal right ventricular size and function.  EF 60%    Pharm ST 12/3/2021    * Chest Pain: No chest pain with administration of Regadenoson.  * Symptom: No Symptom.  * HR Response: Appropriate.  * BP Response: Appropriate.  * Heart Rhythm: Sinus Rhythm - 63 BPM.  * Baseline ECG: Intermittent IVCD noted EKG prior to lexiscan injection.  * ECG Abnormalities: ECG changes could not be interpreted due to bundle branch block.  * Arrhythmia: None.  * Review of raw data shows: The study is of adequate technical quality  * The left ventricle was normal in size. There are medium sized, mild to moderate defects in basal septal, inferior  walls that are partially reversible consistent with infarction with mild-moderate alberto-infarct ischemia.  * Post-stress gated wall motion analysis was performed  (LVEF > 70%;LVEDV = 70 ml.), revealing overall normal LV function with basal inferior and basal septal hypokinesis.     On Jan 14th, 2022 patient had CT angio    1. Coronary artery calcium score =  266 Agatston Units (between the 50th and 75th percentile for subjects of the same age and gender).  2. Coronaries:  --LM: contains luminal irregularities but is patent.  --LAD: proximal LAD contains luminal irregularities but appears patent.    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  The distal LAD is very small in caliber and was not well visualized but appears to taper   proximal to the left ventricular apex.  --LCX: contains luminal irregularities but appears patent without obstructive disease.  --RCA: contains non-calcified plaque in one portion of the proximal segment with minimal (less than 30%) luminal stenosis.  There is mostly   calcified plaque in the proximal to mid RCA with likely moderate (approximately 50-69%) luminal stenosis, although blooming artifact from   calcium limits precise assessment.  There is mixed plaque in the mid RCA with severe (greater than 70%) luminal stenosis.    There is mostly   calcified plaque in the distal RCA with likely moderate (approximately 50-60%) luminal stenosis.    Referred today for [facility]

## 2022-01-25 NOTE — ASU PATIENT PROFILE, ADULT - FALL HARM RISK - UNIVERSAL INTERVENTIONS
Bed in lowest position, wheels locked, appropriate side rails in place/Call bell, personal items and telephone in reach/Instruct patient to call for assistance before getting out of bed or chair/Non-slip footwear when patient is out of bed/[place] to call system/Physically safe environment - no spills, clutter or unnecessary equipment/Purposeful Proactive Rounding/Room/bathroom lighting operational, light cord in reach

## 2022-01-25 NOTE — H&P CARDIOLOGY - NSICDXPASTMEDICALHX_GEN_ALL_CORE_FT
PAST MEDICAL HISTORY:  Abnormal Pap smear of cervix     Breast cancer dx 2016    Hard of hearing     HLD (hyperlipidemia)     HTN (hypertension)     Polyp of corpus uteri     Renal calculi 5 yrs ago

## 2022-01-26 ENCOUNTER — TRANSCRIPTION ENCOUNTER (OUTPATIENT)
Age: 77
End: 2022-01-26

## 2022-01-26 VITALS
HEART RATE: 78 BPM | OXYGEN SATURATION: 97 % | TEMPERATURE: 98 F | RESPIRATION RATE: 18 BRPM | SYSTOLIC BLOOD PRESSURE: 126 MMHG | DIASTOLIC BLOOD PRESSURE: 50 MMHG

## 2022-01-26 LAB
ANION GAP SERPL CALC-SCNC: 13 MMOL/L — SIGNIFICANT CHANGE UP (ref 5–17)
BUN SERPL-MCNC: 25 MG/DL — HIGH (ref 7–23)
CALCIUM SERPL-MCNC: 9.7 MG/DL — SIGNIFICANT CHANGE UP (ref 8.4–10.5)
CHLORIDE SERPL-SCNC: 104 MMOL/L — SIGNIFICANT CHANGE UP (ref 96–108)
CO2 SERPL-SCNC: 21 MMOL/L — LOW (ref 22–31)
CREAT SERPL-MCNC: 0.73 MG/DL — SIGNIFICANT CHANGE UP (ref 0.5–1.3)
GLUCOSE SERPL-MCNC: 110 MG/DL — HIGH (ref 70–99)
MAGNESIUM SERPL-MCNC: 2 MG/DL — SIGNIFICANT CHANGE UP (ref 1.6–2.6)
PHOSPHATE SERPL-MCNC: 4.6 MG/DL — HIGH (ref 2.5–4.5)
POTASSIUM SERPL-MCNC: 4.1 MMOL/L — SIGNIFICANT CHANGE UP (ref 3.5–5.3)
POTASSIUM SERPL-SCNC: 4.1 MMOL/L — SIGNIFICANT CHANGE UP (ref 3.5–5.3)
SODIUM SERPL-SCNC: 138 MMOL/L — SIGNIFICANT CHANGE UP (ref 135–145)

## 2022-01-26 PROCEDURE — 36415 COLL VENOUS BLD VENIPUNCTURE: CPT

## 2022-01-26 PROCEDURE — C9600: CPT | Mod: RC

## 2022-01-26 PROCEDURE — C1894: CPT

## 2022-01-26 PROCEDURE — 93454 CORONARY ARTERY ANGIO S&I: CPT | Mod: 59

## 2022-01-26 PROCEDURE — 84100 ASSAY OF PHOSPHORUS: CPT

## 2022-01-26 PROCEDURE — 93005 ELECTROCARDIOGRAM TRACING: CPT

## 2022-01-26 PROCEDURE — 85027 COMPLETE CBC AUTOMATED: CPT

## 2022-01-26 PROCEDURE — 83735 ASSAY OF MAGNESIUM: CPT

## 2022-01-26 PROCEDURE — C1725: CPT

## 2022-01-26 PROCEDURE — 80053 COMPREHEN METABOLIC PANEL: CPT

## 2022-01-26 PROCEDURE — C1887: CPT

## 2022-01-26 PROCEDURE — C1769: CPT

## 2022-01-26 PROCEDURE — 80048 BASIC METABOLIC PNL TOTAL CA: CPT

## 2022-01-26 PROCEDURE — C1874: CPT

## 2022-01-26 RX ORDER — ASPIRIN/CALCIUM CARB/MAGNESIUM 324 MG
1 TABLET ORAL
Qty: 0 | Refills: 0 | DISCHARGE
Start: 2022-01-26

## 2022-01-26 RX ORDER — ATORVASTATIN CALCIUM 80 MG/1
80 TABLET, FILM COATED ORAL AT BEDTIME
Refills: 0 | Status: DISCONTINUED | OUTPATIENT
Start: 2022-01-26 | End: 2022-01-26

## 2022-01-26 RX ORDER — ACETAMINOPHEN 500 MG
2 TABLET ORAL
Qty: 0 | Refills: 0 | DISCHARGE
Start: 2022-01-26

## 2022-01-26 RX ORDER — ATORVASTATIN CALCIUM 80 MG/1
1 TABLET, FILM COATED ORAL
Qty: 30 | Refills: 0
Start: 2022-01-26 | End: 2022-02-24

## 2022-01-26 RX ORDER — ATORVASTATIN CALCIUM 80 MG/1
1 TABLET, FILM COATED ORAL
Qty: 0 | Refills: 0 | DISCHARGE

## 2022-01-26 RX ORDER — CLOPIDOGREL BISULFATE 75 MG/1
1 TABLET, FILM COATED ORAL
Qty: 90 | Refills: 3
Start: 2022-01-26 | End: 2023-01-20

## 2022-01-26 RX ADMIN — CLOPIDOGREL BISULFATE 75 MILLIGRAM(S): 75 TABLET, FILM COATED ORAL at 05:20

## 2022-01-26 RX ADMIN — Medication 81 MILLIGRAM(S): at 05:19

## 2022-01-26 RX ADMIN — Medication 2000 UNIT(S): at 12:08

## 2022-01-26 RX ADMIN — SODIUM CHLORIDE 3 MILLILITER(S): 9 INJECTION INTRAMUSCULAR; INTRAVENOUS; SUBCUTANEOUS at 05:45

## 2022-01-26 NOTE — DISCHARGE NOTE PROVIDER - HOSPITAL COURSE
HPI:  76 year old female with no implantable cardiac monitoring device and PMH of hard of hearing (bilateral hearing aids), htn, hld, breast cancer 2016 s/p right breast lumpectomy, and + family hx of CAD presents today for cardiac catheterization. Patient reports RIVERA over the past few months. No associated chest pain. Was evaluated by Dr Cherrie Mendoza where cardiac work up revealed-       On Jan 14th, 2022 patient had CT angio    1. Coronary artery calcium score =  266 Agatston Units (between the 50th and 75th percentile for subjects of the same age and gender).  2. Coronaries:  --LM: contains luminal irregularities but is patent.  --LAD: proximal LAD contains luminal irregularities but appears patent.    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  The distal LAD is very small in caliber and was not well visualized but appears to taper   proximal to the left ventricular apex.  --LCX: contains luminal irregularities but appears patent without obstructive disease.  --RCA: contains non-calcified plaque in one portion of the proximal segment with minimal (less than 30%) luminal stenosis.  There is mostly   calcified plaque in the proximal to mid RCA with likely moderate (approximately 50-69%) luminal stenosis, although blooming artifact from   calcium limits precise assessment.  There is mixed plaque in the mid RCA with severe (greater than 70%) luminal stenosis.    There is mostly   calcified plaque in the distal RCA with likely moderate (approximately 50-60%) luminal stenosis.    1/25 cardiac cath with 2 stents to the RCA. Right radial access site without swelling, bleeding.

## 2022-01-26 NOTE — DISCHARGE NOTE PROVIDER - NSDCCPTREATMENT_GEN_ALL_CORE_FT
PRINCIPAL PROCEDURE  Procedure: Placement of coronary artery stent  Findings and Treatment: 2 RCA stents

## 2022-01-26 NOTE — DISCHARGE NOTE NURSING/CASE MANAGEMENT/SOCIAL WORK - PATIENT PORTAL LINK FT
You can access the FollowMyHealth Patient Portal offered by [facility] by registering at the following website: [email or web address]. By joining [facility]’s FollowMyHealth portal, you will also be able to view your health information using other applications (apps) compatible with our system.

## 2022-01-26 NOTE — DISCHARGE NOTE PROVIDER - NSDCMRMEDTOKEN_GEN_ALL_CORE_FT
acetaminophen 325 mg oral tablet: 2 tab(s) orally every 6 hours, As needed, Mild Pain (1 - 3)  anastrozole 1 mg oral tablet: 1 tab(s) orally once a day  aspirin 81 mg oral tablet, chewable: 1 tab(s) orally once a day  atorvastatin 80 mg oral tablet: 1 tab(s) orally once a day (at bedtime)  clopidogrel 75 mg oral tablet: 1 tab(s) orally once a day  Coreg 6.25 mg oral tablet: 1 tab(s) orally 2 times a day  ramipril 10 mg oral capsule: 1 cap(s) orally once a day  Vitamin D3 125 mcg (5000 intl units) oral tablet: 1 tab(s) orally once a day

## 2022-01-26 NOTE — DISCHARGE NOTE PROVIDER - CARE PROVIDER_API CALL
Cherrie Mendoza)  Cardiology; Internal Medicine  [phone] [place] [address]  Phone: [phone]  Fax: [phone]  Follow Up Time: 2 weeks   Cherrie Mendoza)  Cardiology; Internal Medicine  [phone] [place] [address]  Phone: [phone]  Fax: [phone]  Follow Up Time: 1 week

## 2022-01-26 NOTE — DISCHARGE NOTE PROVIDER - NSDCPNSUBOBJ_GEN_ALL_CORE
Patient is a 76y old  Female who presents with a chief complaint of         Allergies    No Known Allergies    Intolerances        Medications:  acetaminophen     Tablet .. 650 milliGRAM(s) Oral every 6 hours PRN  anastrozole 1 milliGRAM(s) Oral daily  aspirin  chewable 81 milliGRAM(s) Oral daily  atorvastatin 80 milliGRAM(s) Oral at bedtime  carvedilol 6.25 milliGRAM(s) Oral every 12 hours  cholecalciferol 2000 Unit(s) Oral daily  clopidogrel Tablet 75 milliGRAM(s) Oral daily  lisinopril 40 milliGRAM(s) Oral daily  sodium chloride 0.9% lock flush 3 milliLiter(s) IV Push every 8 hours      Vitals:  T(C): 36.4 (22 @ 14:50), Max: 36.6 (22 @ 11:25)  HR: 86 (22 @ 20:50) (60 - 86)  BP: 124/61 (22 @ 20:50) (100/45 - 136/42)  BP(mean): 57 (22 @ 17:50) (54 - 75)  RR: 17 (22 @ 20:50) (16 - 17)  SpO2: 96% (22 @ 20:50) (96% - 100%)  Wt(kg): --  Daily Height in cm: 162.56 (2022 11:25)    Daily Weight in k.9 (2022 11:25)  I&O's Summary        Physical Exam:  Appearance: Normal  Eyes: PERRL, EOMI  HENT: Normal oral [place], NC/AT  Cardiovascular: S1S2, RRR, No M/R/G, no JVD, No Lower extremity edema  Procedural Access Site: No hematoma, Non-tender to palpation, 2+ pulse, No bruit, No Ecchymosis  Respiratory: Clear to auscultation bilaterally  Gastrointestinal: Soft, Non tender, Normal Bowel Sounds  Musculoskeletal: No clubbing, No joint deformity   Neurologic: Non-focal  Lymphatic: No lymphadenopathy  Psychiatry: AAOx3, Mood & affect appropriate  Skin: No rashes, No ecchymoses, No cyanosis        139  |  104  |  23  ----------------------------<  101<H>  4.7   |  23  |  0.85    Ca    10.1      2022 12:05    TPro  7.1  /  Alb  4.5  /  TBili  0.7  /  DBili  x   /  AST  14  /  ALT  11  /  AlkPhos  69              Lipid panel   Hgb A1c                         12.9   7.20  )-----------( 323      ( 2022 12:05 )             39.6         ECG: SR 70 bpm    Cath: Monitor radial site for swelling, bleeding. Continue ASA, Plavix     HTN: Continue antihypertensive medications with hold parameters     HLD: continue statin, confirm lipid panel results     Imaging:    Interpretation of Telemetry:

## 2022-01-26 NOTE — DISCHARGE NOTE PROVIDER - NSDCFUADDINST_GEN_ALL_CORE_FT
No heavy lifting or pushing/pulling with procedure arm for 2 weeks. No driving for 2 days. You may shower 24 hours following the procedure but avoid baths/swimming for 1 week. Check your wrist site for bleeding and/or swelling daily following procedure and call your doctor immediately if it occurs or if you experience increased pain at the site. Follow up with your cardiologist in 1-2 weeks. You may call [facility] if you have any questions/concerns regarding your procedure [phone].

## 2022-01-26 NOTE — DISCHARGE NOTE PROVIDER - PROVIDER TOKENS
PROVIDER:[TOKEN:[140:MIIS:140],FOLLOWUP:[2 weeks]] PROVIDER:[TOKEN:[140:MIIS:140],FOLLOWUP:[1 week]]

## 2022-01-26 NOTE — DISCHARGE NOTE NURSING/CASE MANAGEMENT/SOCIAL WORK - NSDCPEFALRISK_GEN_ALL_CORE
For information on Fall & Injury Prevention, visit: [email or web address] OR  [email or web address] OR  https://www.cdc.gov/steadi/patient.html

## 2022-01-26 NOTE — DISCHARGE NOTE PROVIDER - NSDCCPCAREPLAN_GEN_ALL_CORE_FT
PRINCIPAL DISCHARGE DIAGNOSIS  Diagnosis: CAD (coronary artery disease)  Assessment and Plan of Treatment: Do not stop your aspirin or Plavix unless instructed to do so by your cardiologist, they help keep your stented arteries open.   No heavy lifting or pushing/pulling with procedure arm for 2 weeks. No driving for 2 days. You may shower 24 hours following the procedure but avoid baths/swimming for 1 week. Check your wrist site for bleeding and/or swelling daily following procedure and call your doctor immediately if it occurs or if you experience increased pain at the site. Follow up with your cardiologist in 1-2 weeks. You may call [facility] if you have any questions/concerns regarding your procedure [phone].      SECONDARY DISCHARGE DIAGNOSES  Diagnosis: HTN (hypertension)  Assessment and Plan of Treatment: Continue with your blood pressure medications; eat a heart healthy diet with low salt diet; exercise regularly (consult with your physician or cardiologist first); maintain a heart healthy weight; if you smoke - quit (A resource to help you stop smoking is the [facility] [employer] for [employer] Control – phone number [phone].); include healthy ways to manage stress. Continue to follow with your primary care physician or cardiologist.    Diagnosis: HLD (hyperlipidemia)  Assessment and Plan of Treatment: Continue with your cholesterol medications. Eat a heart healthy diet that is low in saturated fats and salt, and includes whole grains, fruits, vegetables and lean protein; exercise regularly (consult with your physician or cardiologist first); maintain a heart healthy weight; if you smoke - quit (A resource to help you stop smoking is the [facility] [employer] for Tobacco Control – phone number [phone].). Continue to follow with your primary physician or cardiologist.

## 2022-01-27 DIAGNOSIS — R93.89 ABNORMAL FINDINGS ON DIAGNOSTIC IMAGING OF OTHER SPECIFIED BODY STRUCTURES: ICD-10-CM

## 2022-01-27 DIAGNOSIS — R92.8 OTHER ABNORMAL AND INCONCLUSIVE FINDINGS ON DIAGNOSTIC IMAGING OF BREAST: ICD-10-CM

## 2022-01-28 ENCOUNTER — APPOINTMENT (OUTPATIENT)
Dept: INTERNAL MEDICINE | Facility: CLINIC | Age: 77
End: 2022-01-28
Payer: MEDICARE

## 2022-01-28 VITALS
TEMPERATURE: 97.6 F | WEIGHT: 153 LBS | OXYGEN SATURATION: 98 % | SYSTOLIC BLOOD PRESSURE: 105 MMHG | DIASTOLIC BLOOD PRESSURE: 64 MMHG | HEIGHT: 64 IN | BODY MASS INDEX: 26.12 KG/M2 | HEART RATE: 74 BPM

## 2022-01-28 DIAGNOSIS — M85.80 OTHER SPECIFIED DISORDERS OF BONE DENSITY AND STRUCTURE, UNSPECIFIED SITE: ICD-10-CM

## 2022-01-28 DIAGNOSIS — R06.00 DYSPNEA, UNSPECIFIED: ICD-10-CM

## 2022-01-28 DIAGNOSIS — R93.1 ABNORMAL FINDINGS ON DIAGNOSTIC IMAGING OF HEART AND CORONARY CIRCULATION: ICD-10-CM

## 2022-01-28 DIAGNOSIS — Z23 ENCOUNTER FOR IMMUNIZATION: ICD-10-CM

## 2022-01-28 PROBLEM — H91.90 UNSPECIFIED HEARING LOSS, UNSPECIFIED EAR: Chronic | Status: ACTIVE | Noted: 2022-01-25

## 2022-01-28 PROCEDURE — 90715 TDAP VACCINE 7 YRS/> IM: CPT

## 2022-01-28 PROCEDURE — 90732 PPSV23 VACC 2 YRS+ SUBQ/IM: CPT

## 2022-01-28 PROCEDURE — G0009: CPT

## 2022-01-28 PROCEDURE — G0439: CPT

## 2022-01-28 PROCEDURE — 90472 IMMUNIZATION ADMIN EACH ADD: CPT

## 2022-01-28 RX ORDER — ASPIRIN ENTERIC COATED TABLETS 81 MG 81 MG/1
81 TABLET, DELAYED RELEASE ORAL
Qty: 90 | Refills: 3 | Status: ACTIVE | COMMUNITY
Start: 2022-01-28

## 2022-01-28 NOTE — PHYSICAL EXAM
[Normal Sclera/Conjunctiva] : normal sclera/conjunctiva [EOMI] : extraocular movements intact [Normal TMs] : both tympanic membranes were normal [Pedal Pulses Present] : the pedal pulses are present [No Edema] : there was no peripheral edema [No Extremity Clubbing/Cyanosis] : no extremity clubbing/cyanosis [Grossly Normal Strength/Tone] : grossly normal strength/tone [No Focal Deficits] : no focal deficits [Normal Gait] : normal gait [Normal] : affect was normal and insight and judgment were intact [de-identified] : significant varicosities of bilat calves [de-identified] : few petechial lesions dorsal right arm [de-identified] : pt wearing bilat foot braces for Achilles tendinitis

## 2022-01-28 NOTE — HEALTH RISK ASSESSMENT
[Never] : Never [No] : In the past 12 months have you used drugs other than those required for medical reasons? No [No falls in past year] : Patient reported no falls in the past year [0] : 2) Feeling down, depressed, or hopeless: Not at all (0) [PHQ-2 Negative - No further assessment needed] : PHQ-2 Negative - No further assessment needed [None] : None [Alone] : lives alone [Retired] : retired [# Of Children ___] : has [unfilled] children [Fully functional (bathing, dressing, toileting, transferring, walking, feeding)] : Fully functional (bathing, dressing, toileting, transferring, walking, feeding) [Fully functional (using the telephone, shopping, preparing meals, housekeeping, doing laundry, using] : Fully functional and needs no help or supervision to perform IADLs (using the telephone, shopping, preparing meals, housekeeping, doing laundry, using transportation, managing medications and managing finances) [Graduate School] : graduate school [de-identified] : see HPI [[id number]] : 0 [Change in mental status noted] : No change in mental status noted [Sexually Active] : not sexually active [PapSmearDate] : 2018 [BoneDensityDate] : 2021 [FreeTextEntry2] : SW

## 2022-01-28 NOTE — REVIEW OF SYSTEMS
[Hearing Loss] : hearing loss [FreeTextEntry2] : sleep is ok, snores loud but denies choking/gasping or stopping breathing, no daytime fatigue [de-identified] : new red spots on arms

## 2022-01-28 NOTE — HISTORY OF PRESENT ILLNESS
[FreeTextEntry1] : Needs new PCP [de-identified] : 77 yo F, recent RCA stents x 2, asymptomatic, abnormal EKG and coronary calcium scan and stress, ended up at cath. Brings in new med list, now on DAPT. No real complaints today, had recent labs inpt and outpt. H/o BC, pathology unknown, s/p L lumpectomy, no X-rt, on AI for 5-6 years, still followed by onc. Also followed by gyne, remote h/o conization for (+) HPV, still getting regular paps, recent benign endometrial polyp. Followed by  ENT for hearing loss, just got new hearing aids. \par Pt walks most days/week, never has cardiac symptoms. Is retired MSW, still does a lot of community work and enjoys being with her family. \par S/P COVID x 3 and flu vaccines, never pneumovax, cannot remember tetanus, not sure about Shingrix.

## 2022-01-31 ENCOUNTER — APPOINTMENT (OUTPATIENT)
Dept: CARDIOLOGY | Facility: CLINIC | Age: 77
End: 2022-01-31
Payer: MEDICARE

## 2022-01-31 VITALS
HEIGHT: 64 IN | OXYGEN SATURATION: 100 % | SYSTOLIC BLOOD PRESSURE: 124 MMHG | BODY MASS INDEX: 26.29 KG/M2 | DIASTOLIC BLOOD PRESSURE: 68 MMHG | HEART RATE: 67 BPM | WEIGHT: 154 LBS

## 2022-01-31 PROCEDURE — 99214 OFFICE O/P EST MOD 30 MIN: CPT

## 2022-01-31 PROCEDURE — 93000 ELECTROCARDIOGRAM COMPLETE: CPT

## 2022-01-31 RX ORDER — ANASTROZOLE 1 MG/1
1 TABLET ORAL
Refills: 0 | Status: DISCONTINUED | COMMUNITY
End: 2022-01-31

## 2022-02-01 LAB
ALBUMIN SERPL ELPH-MCNC: 5.1 G/DL
ALP BLD-CCNC: 81 U/L
ALT SERPL-CCNC: 10 U/L
ANION GAP SERPL CALC-SCNC: 14 MMOL/L
AST SERPL-CCNC: 13 U/L
BASOPHILS # BLD AUTO: 0.07 K/UL
BASOPHILS NFR BLD AUTO: 0.7 %
BILIRUB SERPL-MCNC: 0.6 MG/DL
BUN SERPL-MCNC: 27 MG/DL
CALCIUM SERPL-MCNC: 10.3 MG/DL
CHLORIDE SERPL-SCNC: 102 MMOL/L
CHOLEST SERPL-MCNC: 123 MG/DL
CO2 SERPL-SCNC: 24 MMOL/L
CREAT SERPL-MCNC: 0.86 MG/DL
EOSINOPHIL # BLD AUTO: 0.29 K/UL
EOSINOPHIL NFR BLD AUTO: 2.8 %
ESTIMATED AVERAGE GLUCOSE: 111 MG/DL
GLUCOSE SERPL-MCNC: 99 MG/DL
HBA1C MFR BLD HPLC: 5.5 %
HCT VFR BLD CALC: 41 %
HDLC SERPL-MCNC: 43 MG/DL
HGB BLD-MCNC: 13.4 G/DL
IMM GRANULOCYTES NFR BLD AUTO: 0.3 %
LDLC SERPL CALC-MCNC: 46 MG/DL
LYMPHOCYTES # BLD AUTO: 2.06 K/UL
LYMPHOCYTES NFR BLD AUTO: 19.7 %
MAN DIFF?: NORMAL
MCHC RBC-ENTMCNC: 28.8 PG
MCHC RBC-ENTMCNC: 32.7 GM/DL
MCV RBC AUTO: 88.2 FL
MONOCYTES # BLD AUTO: 0.75 K/UL
MONOCYTES NFR BLD AUTO: 7.2 %
NEUTROPHILS # BLD AUTO: 7.26 K/UL
NEUTROPHILS NFR BLD AUTO: 69.3 %
NONHDLC SERPL-MCNC: 80 MG/DL
PLATELET # BLD AUTO: 412 K/UL
POTASSIUM SERPL-SCNC: 4.9 MMOL/L
PROT SERPL-MCNC: 7.3 G/DL
RBC # BLD: 4.65 M/UL
RBC # FLD: 13.1 %
SODIUM SERPL-SCNC: 139 MMOL/L
TRIGL SERPL-MCNC: 170 MG/DL
TSH SERPL-ACNC: 0.83 UIU/ML
WBC # FLD AUTO: 10.46 K/UL

## 2022-02-01 NOTE — ASSESSMENT
[FreeTextEntry1] : 75 yo w\par CAD - newly dx - abnl noninvasive testing. Did well after 2 vessel PCI ( DREW), on DAPT , high dose statin, BBl , ACEI.\par \par Have scheduled ETT in mid march and then will refer to cardiac rehab.\par

## 2022-02-01 NOTE — REASON FOR VISIT
[FreeTextEntry1] : 77 yo who recently underwent multivessel PCI after RIVERA w/up after testing ( stress CTA , ango ) all showed multivessel disease.\par \par She is feeling great after the procedure . \par Reviewed new/current meds. No issues of bleeding on DAPT. Some minor brusing.\par \par Reviewed recent new PCP visit w Dr Duffy.  GREAT inititla visit.\par \par No CP, RIVERA , palpitations.

## 2022-03-04 ENCOUNTER — OUTPATIENT (OUTPATIENT)
Dept: OUTPATIENT SERVICES | Facility: HOSPITAL | Age: 77
LOS: 1 days | End: 2022-03-04
Payer: COMMERCIAL

## 2022-03-04 ENCOUNTER — APPOINTMENT (OUTPATIENT)
Dept: CV DIAGNOSTICS | Facility: HOSPITAL | Age: 77
End: 2022-03-04

## 2022-03-04 DIAGNOSIS — Z98.890 OTHER SPECIFIED POSTPROCEDURAL STATES: Chronic | ICD-10-CM

## 2022-03-04 DIAGNOSIS — Z90.89 ACQUIRED ABSENCE OF OTHER ORGANS: Chronic | ICD-10-CM

## 2022-03-04 DIAGNOSIS — I10 ESSENTIAL (PRIMARY) HYPERTENSION: ICD-10-CM

## 2022-03-04 PROCEDURE — 93016 CV STRESS TEST SUPVJ ONLY: CPT

## 2022-03-04 PROCEDURE — 93018 CV STRESS TEST I&R ONLY: CPT

## 2022-03-04 PROCEDURE — 93017 CV STRESS TEST TRACING ONLY: CPT

## 2022-03-23 ENCOUNTER — APPOINTMENT (OUTPATIENT)
Dept: CARDIOLOGY | Facility: CLINIC | Age: 77
End: 2022-03-23

## 2022-03-23 NOTE — HISTORY OF PRESENT ILLNESS
[Does patient monitor blood pressure at home?] : patient monitors blood pressure at home [Low sodium diet] : low sodium diet [Stress management techniques] : stress management techniques [Weight management] : weight management [Patient will maintain blood pressure < 130/80 mmHg] : patient will maintain blood pressure < 130/80 mmHg [Guidelines for blood pressure management] : guidelines for blood pressure management [Signs and symptoms] : signs and symptoms [Yes, continue with Hypertension plan] : Yes, continue with Hypertension plan [FreeTextEntry4] : carvedilol; ramipril [Hearing Impaired] : hearing impaired [PCI/Stent] : PCI/stent [Age] : age [HLD] : HLD [Angina with exercise] : no angina with exercise [Fall Risk] : fall risk [Perform aerobic activity for ___ consecutive minutes] : perform aerobic activity for [unfilled] consecutive minutes [To start resistance training] : to start resistance training [Individualized Exercise Rx] : individualized exercise Rx [Welcome packet provided] : welcome packet provided [Yes, per exercise prescription, policy] : Yes, per exercise prescription, policy [FreeTextEntry1] : Dr. Mendoza [FreeTextEntry5] : Dr. Lombardi Congliaro [de-identified] : Pt reports a recent fall after getting up from couch; patient was told that this was related to dehydration and altitude changes as she was visiting her daughter in [place].\par H/O chronic right Achilles tendonitis [Maintenance] : Stage of change: mMaintenance [Discuss overview of emotional health supportive modalities] : Discuss overview of emotional health supportive modalities [Yes, continue with psychosocial plan] : Yes, continue with psychosocial plan [FreeTextEntry8] : "helping people," roxy, word 2 game [FreeTextEntry9] : Pt states she has been a  for over 20 years; she is a retired , and now is very active in volunteering / counseling\par She reports a strong network of support, 3 daughters, 6 grandchildren\par  [Action] : Stage of change: Action [Hypertension] : Hypertension [Other: ___] : [unfilled] [Sugar] : sugar [Sodium] : sodium [Cholesterol] : cholesterol [Trans fats/saturated fats] : trans fats/saturated fats [] : no [Atorvastatin] : atorvastatin [Is Patient adherent with medication?] : patient is adherent with medication [Self] : self [Patient has seen registered dietitian in the past?] : Patient has seen registered dietitian in the past? No [Self-reports of improved health eating patterns] : Self-reports of improved health eating patterns [Discuss an overview of healthy eating plan] : Discuss an overview of healthy eating plan [MyPlate.gov] : MyPlate.gov [Teach back utilized] : teach back utilized [Yes, continue with nutrition plan] : Yes, continue with nutrition plan [In progress] : in progress [FreeTextEntry6] : sweets [FreeTextEntry7] : Patient states she intentionally lost 15 lbs. over past year and is content with her weight.

## 2022-03-23 NOTE — REASON FOR VISIT
[Home] : at home, [unfilled] , at the time of the visit. [Medical Office: ([facility])___] : at the medical office located in  [Verbal consent obtained from patient] : the patient, [unfilled] [Assessment] : an assessment [FreeTextEntry1] : Initial Intake; will finalize ITP at session #1; Pt is s/p PCI

## 2022-05-09 ENCOUNTER — NON-APPOINTMENT (OUTPATIENT)
Age: 77
End: 2022-05-09

## 2022-05-16 ENCOUNTER — APPOINTMENT (OUTPATIENT)
Dept: CARDIOLOGY | Facility: CLINIC | Age: 77
End: 2022-05-16
Payer: MEDICARE

## 2022-05-16 ENCOUNTER — NON-APPOINTMENT (OUTPATIENT)
Age: 77
End: 2022-05-16

## 2022-05-16 VITALS — HEART RATE: 62 BPM | SYSTOLIC BLOOD PRESSURE: 116 MMHG | OXYGEN SATURATION: 98 % | DIASTOLIC BLOOD PRESSURE: 68 MMHG

## 2022-05-16 PROCEDURE — 93000 ELECTROCARDIOGRAM COMPLETE: CPT

## 2022-05-16 PROCEDURE — 99214 OFFICE O/P EST MOD 30 MIN: CPT

## 2022-05-23 NOTE — REASON FOR VISIT
[FreeTextEntry1] : 75 yo w abnl stress , CAC followed by cath. Found to have significant RCA dz ( PCI w TOM x 2 done) and some LAD dz.\par Did well after procedure. Had stress and will start cardiac rehab shortly.\par \par Saw Dr CARYL Duffy for the first time and will follow up w her.\par \par \par Feeling great since last visit - no CP, RIVERA or palitations.\par On DAPT - no bleeding or unusual bruising.\par \par reviewed med list.\par \par Rarely feels a biut dizzy when she first gets up in the morning.\par No palpitations.

## 2022-06-01 ENCOUNTER — APPOINTMENT (OUTPATIENT)
Dept: CARDIOLOGY | Facility: CLINIC | Age: 77
End: 2022-06-01
Payer: MEDICARE

## 2022-06-01 PROCEDURE — 93798 PHYS/QHP OP CAR RHAB W/ECG: CPT

## 2022-06-06 ENCOUNTER — APPOINTMENT (OUTPATIENT)
Dept: CARDIOLOGY | Facility: CLINIC | Age: 77
End: 2022-06-06
Payer: MEDICARE

## 2022-06-06 PROCEDURE — 93798 PHYS/QHP OP CAR RHAB W/ECG: CPT

## 2022-06-13 ENCOUNTER — APPOINTMENT (OUTPATIENT)
Dept: CARDIOLOGY | Facility: CLINIC | Age: 77
End: 2022-06-13
Payer: MEDICARE

## 2022-06-13 PROCEDURE — 93798 PHYS/QHP OP CAR RHAB W/ECG: CPT

## 2022-06-16 ENCOUNTER — APPOINTMENT (OUTPATIENT)
Dept: CARDIOLOGY | Facility: CLINIC | Age: 77
End: 2022-06-16
Payer: MEDICARE

## 2022-06-16 PROCEDURE — 93798 PHYS/QHP OP CAR RHAB W/ECG: CPT

## 2022-06-20 ENCOUNTER — APPOINTMENT (OUTPATIENT)
Dept: CARDIOLOGY | Facility: CLINIC | Age: 77
End: 2022-06-20
Payer: MEDICARE

## 2022-06-20 PROCEDURE — 93798 PHYS/QHP OP CAR RHAB W/ECG: CPT

## 2022-06-20 NOTE — HISTORY OF PRESENT ILLNESS
[Sitting: ___] : Sitting: [unfilled] [Does patient monitor blood pressure at home?] : patient monitors blood pressure at home [Low sodium diet] : low sodium diet [Stress management techniques] : stress management techniques [Weight management] : weight management [Patient will verbalize factors contributing to improved blood pressure management] : Patient will verbalize factors contributing to improved blood pressure management [Patient will maintain blood pressure < 130/80 mmHg] : patient will maintain blood pressure < 130/80 mmHg [Medication Adherence] : medication adherence [Guidelines for blood pressure management] : guidelines for blood pressure management [Signs and symptoms] : signs and symptoms [Exercise and blood pressure] : exercise and blood pressure [Yes, continue with Hypertension plan] : Yes, continue with Hypertension plan [Hearing Impaired] : hearing impaired [PCI/Stent] : PCI/stent [Age] : age [HLD] : HLD [Fall Risk] : fall risk [BP: ____ mmHg] : BP: [unfilled] mmHg [HR: ____ bpm] : BP: [unfilled] bpm [O2sat: ____ %] : O2sat: [unfilled] % [RPE: ____] : RPE: [unfilled]  [METS: ____] : METS: [unfilled]  [___ Days per week] : [unfilled] days per week [>= 31 minutes per session] : >= 31 minutes per session [Treadmill] : treadmill [Recumbent bike] : recumbent bike [Upright exercise bike] : upright exercise bike [Upper body ergometer] : upper body ergometer [Assess in ___ weeks] : assess in [unfilled] weeks [Stretching/ROM exercises and balance exercises daily] : Stretching/ROM exercises and balance exercises daily [Will assess for musculoskeletal and other restrictions] : will assess for musculoskeletal and other restrictions [Perform aerobic activity for ___ consecutive minutes] : perform aerobic activity for [unfilled] consecutive minutes [To start resistance training] : to start resistance training [Equipment Orientation/Safety] : equipment orientation/safety [Individualized Exercise Rx] : individualized exercise Rx [Hemodynamic responses] : hemodynamic responses [Patient verbalizes understanding of exercise education all questions answered] : Patient verbalizes understanding of exercise education all questions answered [Return demonstration] : return demonstration [Yes, per exercise prescription, policy] : Yes, per exercise prescription, policy [Maintenance] : Stage of change: mMaintenance [PHQ-9: ___] : PHQ-9: [unfilled] [Physical Fitness Score: ____] : Physical Fitness Score: [unfilled] [Breathing exercises] : breathing exercises [Visual imagery] : visual imagery [Self-reports of improved psychosocial well-being] : Self-reports of improved psychosocial well-being [Discuss overview of emotional health supportive modalities] : Discuss overview of emotional health supportive modalities [Relaxation breathing techniques] : relaxation breathing techniques [Yes, continue with psychosocial plan] : Yes, continue with psychosocial plan [Action] : Stage of change: Action [Rate your plate score: ____] : Rate your plate score: [unfilled] [Hypertension] : Hypertension [Other: ___] : [unfilled] [Sugar] : sugar [Sodium] : sodium [Cholesterol] : cholesterol [Trans fats/saturated fats] : trans fats/saturated fats [Atorvastatin] : atorvastatin [Is Patient adherent with medication?] : patient is adherent with medication [Self] : self [Self-reports of improved health eating patterns] : Self-reports of improved health eating patterns [DASH Diet] : DASH diet [Discuss an overview of healthy eating plan] : Discuss an overview of healthy eating plan [MyPlate.gov] : MyPlate.gov [Teach back utilized] : teach back utilized [Yes, continue with nutrition plan] : Yes, continue with nutrition plan [In progress] : in progress [FreeTextEntry4] : carvedilol; ramipril [Angina with exercise] : no angina with exercise [FreeTextEntry1] : Dr. Mendoza [FreeTextEntry5] : Dr. Lombardi Congliaro [de-identified] : Prior to exercise, pt's resting BP: 88/42, drank 16 ounces of water, 3/10 lightheadedness resolved with hydration. \par Pt. was oriented to modalities and facility. Educated pt. on the importance of hydration and taking beta blocker earlier before CR for better BP control,  pt. verbalized understanding.\par \par Pt's BP will remain stable\par Pt reports a recent fall after getting up from couch; patient was told that this was related to dehydration and altitude changes as she was visiting her daughter in [place].\par H/O chronic right Achilles tendonitis [[facility] Total: ___] : [facility] COOP score total: [unfilled] [Feelings Score: ___] : Feelings Score: [unfilled] [Daily Activities Score: ___] : Daily Activities Score: [unfilled] [Social Activities Score: ___] : Social Activities Score: [unfilled] [Pain Score: ___] : Pain Score: [unfilled] [Overall Health Score: ___] : Overall Health Score: [unfilled] [Change in Health Score: ___] : Change in Health Score: [unfilled] [Quality of Life Score: ___] : Quality of Life Score: [unfilled] [Social Support Score: ___] : Social Support Score: [unfilled] [FreeTextEntry9] : Pt. will maintain emotional well-being\par Pt states she has been a  for over 20 years; she is a retired , and now is very active in volunteering / counseling\par She reports a strong network of support, 3 daughters, 6 grandchildren\par  [] : no [Patient has seen registered dietitian in the past?] : Patient has seen registered dietitian in the past? No [FreeTextEntry6] : sweets [FreeTextEntry8] : Pt. will adhere to a heart healthy diet. [FreeTextEntry7] : Patient states she intentionally lost 15 lbs. over past year and is content with her weight.

## 2022-06-22 ENCOUNTER — APPOINTMENT (OUTPATIENT)
Dept: CARDIOLOGY | Facility: CLINIC | Age: 77
End: 2022-06-22
Payer: MEDICARE

## 2022-06-22 PROCEDURE — 93798 PHYS/QHP OP CAR RHAB W/ECG: CPT

## 2022-06-27 ENCOUNTER — APPOINTMENT (OUTPATIENT)
Dept: CARDIOLOGY | Facility: CLINIC | Age: 77
End: 2022-06-27
Payer: MEDICARE

## 2022-06-27 PROCEDURE — 93798 PHYS/QHP OP CAR RHAB W/ECG: CPT

## 2022-06-30 ENCOUNTER — APPOINTMENT (OUTPATIENT)
Dept: CARDIOLOGY | Facility: CLINIC | Age: 77
End: 2022-06-30

## 2022-06-30 PROCEDURE — 93798 PHYS/QHP OP CAR RHAB W/ECG: CPT

## 2022-07-01 ENCOUNTER — NON-APPOINTMENT (OUTPATIENT)
Age: 77
End: 2022-07-01

## 2022-07-06 ENCOUNTER — APPOINTMENT (OUTPATIENT)
Dept: CARDIOLOGY | Facility: CLINIC | Age: 77
End: 2022-07-06

## 2022-07-06 PROCEDURE — 93798 PHYS/QHP OP CAR RHAB W/ECG: CPT

## 2022-07-11 ENCOUNTER — APPOINTMENT (OUTPATIENT)
Dept: CARDIOLOGY | Facility: CLINIC | Age: 77
End: 2022-07-11

## 2022-07-11 PROCEDURE — 93798 PHYS/QHP OP CAR RHAB W/ECG: CPT

## 2022-07-13 ENCOUNTER — APPOINTMENT (OUTPATIENT)
Dept: CARDIOLOGY | Facility: CLINIC | Age: 77
End: 2022-07-13

## 2022-07-13 PROCEDURE — 93798 PHYS/QHP OP CAR RHAB W/ECG: CPT

## 2022-07-18 ENCOUNTER — APPOINTMENT (OUTPATIENT)
Dept: CARDIOLOGY | Facility: CLINIC | Age: 77
End: 2022-07-18

## 2022-07-18 PROCEDURE — 93798 PHYS/QHP OP CAR RHAB W/ECG: CPT

## 2022-07-19 ENCOUNTER — APPOINTMENT (OUTPATIENT)
Dept: INTERNAL MEDICINE | Facility: CLINIC | Age: 77
End: 2022-07-19

## 2022-07-19 VITALS
SYSTOLIC BLOOD PRESSURE: 115 MMHG | DIASTOLIC BLOOD PRESSURE: 61 MMHG | HEIGHT: 64 IN | HEART RATE: 68 BPM | OXYGEN SATURATION: 98 % | TEMPERATURE: 97.3 F

## 2022-07-19 VITALS — WEIGHT: 155 LBS | BODY MASS INDEX: 26.61 KG/M2

## 2022-07-19 PROCEDURE — 99213 OFFICE O/P EST LOW 20 MIN: CPT

## 2022-07-19 RX ORDER — ZOSTER VACCINE RECOMBINANT, ADJUVANTED 50 MCG/0.5
50 KIT INTRAMUSCULAR
Qty: 1 | Refills: 1 | Status: ACTIVE | COMMUNITY
Start: 2022-07-19 | End: 1900-01-01

## 2022-07-19 NOTE — HISTORY OF PRESENT ILLNESS
[FreeTextEntry1] : f/u [de-identified] : 75 yo F, CAD, here for f/u. Doing 2 x/week cardiac rehab, doing well, increasing her endurance. No other symptoms, feels well. Never went for Shingles vaccines, S/P COVID x 4 vaccine/boosters. Discussed CRC screening, pt remembers a c-scope more than 10 years ago, had early diverticular dz, no polyps.

## 2022-07-19 NOTE — PHYSICAL EXAM
Cataract(s) are not yet visually significant to the patient. Recommend monitoring, and patient agrees with this plan. [Normal] : no acute distress, well nourished, well developed and well-appearing [No Respiratory Distress] : no respiratory distress  [No Edema] : there was no peripheral edema [Normal Gait] : normal gait

## 2022-07-20 ENCOUNTER — APPOINTMENT (OUTPATIENT)
Dept: CARDIOLOGY | Facility: CLINIC | Age: 77
End: 2022-07-20

## 2022-07-20 PROCEDURE — 93798 PHYS/QHP OP CAR RHAB W/ECG: CPT

## 2022-07-25 ENCOUNTER — APPOINTMENT (OUTPATIENT)
Dept: CARDIOLOGY | Facility: CLINIC | Age: 77
End: 2022-07-25

## 2022-07-25 PROCEDURE — 93798 PHYS/QHP OP CAR RHAB W/ECG: CPT

## 2022-07-27 ENCOUNTER — APPOINTMENT (OUTPATIENT)
Dept: CARDIOLOGY | Facility: CLINIC | Age: 77
End: 2022-07-27

## 2022-07-27 PROCEDURE — 93798 PHYS/QHP OP CAR RHAB W/ECG: CPT

## 2022-07-31 ENCOUNTER — NON-APPOINTMENT (OUTPATIENT)
Age: 77
End: 2022-07-31

## 2022-08-01 ENCOUNTER — APPOINTMENT (OUTPATIENT)
Dept: CARDIOLOGY | Facility: CLINIC | Age: 77
End: 2022-08-01

## 2022-08-01 PROCEDURE — 93798 PHYS/QHP OP CAR RHAB W/ECG: CPT

## 2022-08-03 ENCOUNTER — APPOINTMENT (OUTPATIENT)
Dept: CARDIOLOGY | Facility: CLINIC | Age: 77
End: 2022-08-03

## 2022-08-03 PROCEDURE — 93798 PHYS/QHP OP CAR RHAB W/ECG: CPT

## 2022-08-11 NOTE — HISTORY OF PRESENT ILLNESS
[Sitting: ___] : Sitting: [unfilled] [Does patient monitor blood pressure at home?] : patient monitors blood pressure at home [Low sodium diet] : low sodium diet [Stress management techniques] : stress management techniques [Weight management] : weight management [Patient will verbalize factors contributing to improved blood pressure management] : Patient will verbalize factors contributing to improved blood pressure management [Patient will maintain blood pressure < 130/80 mmHg] : patient will maintain blood pressure < 130/80 mmHg [Medication Adherence] : medication adherence [Halt the salt] : halt the salt [Guidelines for blood pressure management] : guidelines for blood pressure management [Define hypertension] : define hypertension [Signs and symptoms] : signs and symptoms [Role of lifestyle behaviors in blood pressure management] : role of lifestyle behaviors in blood pressure management [Exercise and blood pressure] : exercise and blood pressure [Food labels] : food labels [Factors affecting blood pressure] : factors affecting blood pressure [Processed food] : processed food [Eating out] : eating out [Take out] : take out [Yes, continue with Hypertension plan] : Yes, continue with Hypertension plan [Hearing Impaired] : hearing impaired [PCI/Stent] : PCI/stent [Age] : age [HLD] : HLD [Fall Risk] : fall risk [BP: ____ mmHg] : BP: [unfilled] mmHg [HR: ____ bpm] : BP: [unfilled] bpm [O2sat: ____ %] : O2sat: [unfilled] % [RPE: ____] : RPE: [unfilled]  [___ Days per week] : [unfilled] days per week [>= 31 minutes per session] : >= 31 minutes per session [Target RPE: ___] : Target RPE: [unfilled] [Treadmill] : treadmill [Recumbent bike] : recumbent bike [Upright exercise bike] : upright exercise bike [BioStep] : BioStep [Elliptical] : elliptical [Upper body ergometer] : upper body ergometer [Stair Climber] : stair climber [Assess in ___ weeks] : assess in [unfilled] weeks [Stretching/ROM exercises and balance exercises daily] : Stretching/ROM exercises and balance exercises daily [Will assess for musculoskeletal and other restrictions] : will assess for musculoskeletal and other restrictions [Perform aerobic activity for ___ consecutive minutes] : perform aerobic activity for [unfilled] consecutive minutes [To start resistance training] : to start resistance training [Individualized Exercise Rx] : individualized exercise Rx [Hemodynamic responses] : hemodynamic responses [Patient verbalizes understanding of exercise education all questions answered] : Patient verbalizes understanding of exercise education all questions answered [Return demonstration] : return demonstration [Yes, per exercise prescription, policy] : Yes, per exercise prescription, policy [Maintenance] : Stage of change: mMaintenance [PHQ-9: ___] : PHQ-9: [unfilled] [[facility]: ___] : [facility] total: [unfilled] [Feelings Score: ___] : Feelings Score: [unfilled] [Physical Fitness Score: ____] : Physical Fitness Score: [unfilled] [Daily Activities Score: ___] : Daily Activities Score: [unfilled] [Social Activities Score: ___] : Social Activities Score: [unfilled] [Pain Score: ___] : Pain Score: [unfilled] [Overall Health Score: ___] : Overall Health Score: [unfilled] [Change in Health Score: ___] : Change in Health Score: [unfilled] [Quality of Life Score: ___] : Quality of Life Score: [unfilled] [Social Support Score: ___] : Social Support Score: [unfilled] [Breathing exercises] : breathing exercises [Visual imagery] : visual imagery [Self-reports of improved psychosocial well-being] : Self-reports of improved psychosocial well-being [Discuss overview of emotional health supportive modalities] : Discuss overview of emotional health supportive modalities [Relaxation breathing techniques] : relaxation breathing techniques [Yes, continue with psychosocial plan] : Yes, continue with psychosocial plan [Action] : Stage of change: Action [Rate your plate score: ____] : Rate your plate score: [unfilled] [Hypertension] : Hypertension [Other: ___] : [unfilled] [Sugar] : sugar [Sodium] : sodium [Cholesterol] : cholesterol [Trans fats/saturated fats] : trans fats/saturated fats [Atorvastatin] : atorvastatin [Is Patient adherent with medication?] : patient is adherent with medication [Self] : self [Self-reports of improved health eating patterns] : Self-reports of improved health eating patterns [DASH Diet] : DASH diet [Discuss an overview of healthy eating plan] : Discuss an overview of healthy eating plan [MyPlate.gov] : MyPlate.gov [Teach back utilized] : teach back utilized [Yes, continue with nutrition plan] : Yes, continue with nutrition plan [In progress] : in progress [Medications] : medications [FreeTextEntry4] : carvedilol; ramipril [Angina with exercise] : no angina with exercise [METS: ____] : METS: [unfilled]  [Exercise Benefits/Guidelines education] : exercise benefits/guidelines education [Signs/Symptoms to report] : signs/symptoms to report [FreeTextEntry1] : Dr. Mendoza [FreeTextEntry5] : Dr. Lombardi Congliaro [de-identified] : patient invited to attend weekly  virtual cardiac rehab [de-identified] : 7/27/22 METS: 3.9 (RB) increased from 2.0 at initial ITP; 3.5 (TM)\par session#1: Prior to exercise, pt's resting BP: 88/42, drank 16 ounces of water, 3/10 lightheadedness resolved with hydration. \par Pt. was oriented to modalities and facility. Educated pt. on the importance of hydration and taking beta blocker earlier before CR for better BP control,  pt. verbalized understanding.\par \par Pt's BP will remain stable\par Pt reports a recent fall after getting up from couch; patient was told that this was related to dehydration and altitude changes as she was visiting her daughter in [place].\par H/O chronic right Achilles tendonitis [de-identified] : CareNotes: physical activity and your heart [] : no [Patient has seen registered dietitian in the past?] : Patient has seen registered dietitian in the past? No [FreeTextEntry6] : sweets [FreeTextEntry8] : Pt. will adhere to a heart healthy diet.\par 7/27/22: weight: 157.9 lbs.(initial weight at session 1 = 158.7 lbs.) [FreeTextEntry7] : Patient states she intentionally lost 15 lbs. over past year and is content with her weight. [FreeTextEntry9] : education: [employer] Your Heart; [employer] Wellness Goals

## 2022-08-22 ENCOUNTER — NON-APPOINTMENT (OUTPATIENT)
Age: 77
End: 2022-08-22

## 2022-08-22 ENCOUNTER — APPOINTMENT (OUTPATIENT)
Dept: CARDIOLOGY | Facility: CLINIC | Age: 77
End: 2022-08-22

## 2022-08-22 VITALS
BODY MASS INDEX: 26.8 KG/M2 | SYSTOLIC BLOOD PRESSURE: 115 MMHG | OXYGEN SATURATION: 98 % | HEART RATE: 63 BPM | HEIGHT: 64 IN | DIASTOLIC BLOOD PRESSURE: 70 MMHG | WEIGHT: 157 LBS

## 2022-08-22 PROCEDURE — 93798 PHYS/QHP OP CAR RHAB W/ECG: CPT

## 2022-08-22 PROCEDURE — 93000 ELECTROCARDIOGRAM COMPLETE: CPT

## 2022-08-22 PROCEDURE — 99214 OFFICE O/P EST MOD 30 MIN: CPT | Mod: 25

## 2022-08-22 NOTE — ASSESSMENT
[FreeTextEntry1] : 75 yo w \par CAD - s/p pci in 1/22. Will check w BK about duration of DAPT. minor bruising only.\par LBBB - not seen in 2018. Last echo nl but will schedule follow up echo to exclude LV dysfunction.\par HTN - good control\par Lipids - doing well on high dose lipitor

## 2022-08-23 ENCOUNTER — NON-APPOINTMENT (OUTPATIENT)
Age: 77
End: 2022-08-23

## 2022-08-24 ENCOUNTER — APPOINTMENT (OUTPATIENT)
Dept: CARDIOLOGY | Facility: CLINIC | Age: 77
End: 2022-08-24

## 2022-08-24 PROCEDURE — 93798 PHYS/QHP OP CAR RHAB W/ECG: CPT

## 2022-08-29 ENCOUNTER — APPOINTMENT (OUTPATIENT)
Dept: CARDIOLOGY | Facility: CLINIC | Age: 77
End: 2022-08-29

## 2022-08-29 PROCEDURE — 93798 PHYS/QHP OP CAR RHAB W/ECG: CPT

## 2022-08-30 ENCOUNTER — NON-APPOINTMENT (OUTPATIENT)
Age: 77
End: 2022-08-30

## 2022-08-31 ENCOUNTER — APPOINTMENT (OUTPATIENT)
Dept: CARDIOLOGY | Facility: CLINIC | Age: 77
End: 2022-08-31

## 2022-08-31 PROCEDURE — 93798 PHYS/QHP OP CAR RHAB W/ECG: CPT

## 2022-09-07 ENCOUNTER — APPOINTMENT (OUTPATIENT)
Dept: CARDIOLOGY | Facility: CLINIC | Age: 77
End: 2022-09-07

## 2022-09-07 PROCEDURE — 93798 PHYS/QHP OP CAR RHAB W/ECG: CPT

## 2022-09-09 ENCOUNTER — APPOINTMENT (OUTPATIENT)
Dept: CARDIOLOGY | Facility: CLINIC | Age: 77
End: 2022-09-09

## 2022-09-09 PROCEDURE — 93306 TTE W/DOPPLER COMPLETE: CPT

## 2022-09-12 ENCOUNTER — APPOINTMENT (OUTPATIENT)
Dept: CARDIOLOGY | Facility: CLINIC | Age: 77
End: 2022-09-12

## 2022-09-12 PROCEDURE — 93798 PHYS/QHP OP CAR RHAB W/ECG: CPT

## 2022-09-13 ENCOUNTER — NON-APPOINTMENT (OUTPATIENT)
Age: 77
End: 2022-09-13

## 2022-09-14 ENCOUNTER — APPOINTMENT (OUTPATIENT)
Dept: CARDIOLOGY | Facility: CLINIC | Age: 77
End: 2022-09-14

## 2022-09-14 PROCEDURE — 93798 PHYS/QHP OP CAR RHAB W/ECG: CPT

## 2022-11-18 ENCOUNTER — APPOINTMENT (OUTPATIENT)
Dept: INTERNAL MEDICINE | Facility: CLINIC | Age: 77
End: 2022-11-18

## 2022-11-18 VITALS
WEIGHT: 162 LBS | HEART RATE: 62 BPM | SYSTOLIC BLOOD PRESSURE: 108 MMHG | OXYGEN SATURATION: 98 % | DIASTOLIC BLOOD PRESSURE: 69 MMHG | TEMPERATURE: 97.3 F | BODY MASS INDEX: 27.81 KG/M2

## 2022-11-18 DIAGNOSIS — R93.89 ABNORMAL FINDINGS ON DIAGNOSTIC IMAGING OF OTHER SPECIFIED BODY STRUCTURES: ICD-10-CM

## 2022-11-18 DIAGNOSIS — R10.2 PELVIC AND PERINEAL PAIN: ICD-10-CM

## 2022-11-18 DIAGNOSIS — I44.7 LEFT BUNDLE-BRANCH BLOCK, UNSPECIFIED: ICD-10-CM

## 2022-11-18 PROCEDURE — 99214 OFFICE O/P EST MOD 30 MIN: CPT

## 2022-11-18 RX ORDER — ZOSTER VACCINE RECOMBINANT, ADJUVANTED 50 MCG/0.5
50 KIT INTRAMUSCULAR
Qty: 1 | Refills: 1 | Status: DISCONTINUED | COMMUNITY
Start: 2022-05-10 | End: 2022-11-18

## 2022-11-18 RX ORDER — CLOPIDOGREL BISULFATE 75 MG/1
75 TABLET, FILM COATED ORAL
Qty: 30 | Refills: 5 | Status: DISCONTINUED | COMMUNITY
Start: 2022-01-28 | End: 2022-11-18

## 2022-11-18 NOTE — HISTORY OF PRESENT ILLNESS
[FreeTextEntry1] : f/u medical issues [de-identified] : 77 yo F, CAD, here for f/u. Completed cardiac rehab increased her endurance, feels well. Off plavix past few months due to new recs. Also off the E, told on balance with her CAD benefits do not outweigh harms. Also had recent increase of statin dose, pt asked about this. Otherwise, feels well. Went for first  Shingles vaccine in June, due for #2. Also scheduled for c-scope pre-screen.

## 2022-11-18 NOTE — PHYSICAL EXAM
[Normal Mood] : the mood was normal [Normal] : affect was normal and insight and judgment were intact

## 2022-11-28 ENCOUNTER — APPOINTMENT (OUTPATIENT)
Dept: CARDIOLOGY | Facility: CLINIC | Age: 77
End: 2022-11-28

## 2022-11-28 ENCOUNTER — NON-APPOINTMENT (OUTPATIENT)
Age: 77
End: 2022-11-28

## 2022-11-28 VITALS
OXYGEN SATURATION: 99 % | SYSTOLIC BLOOD PRESSURE: 121 MMHG | HEART RATE: 68 BPM | DIASTOLIC BLOOD PRESSURE: 69 MMHG | WEIGHT: 162 LBS | BODY MASS INDEX: 27.66 KG/M2 | HEIGHT: 64 IN

## 2022-11-28 PROCEDURE — 99214 OFFICE O/P EST MOD 30 MIN: CPT | Mod: 25

## 2022-11-28 PROCEDURE — 93000 ELECTROCARDIOGRAM COMPLETE: CPT

## 2022-12-05 LAB
ALBUMIN SERPL ELPH-MCNC: 4.7 G/DL
ALP BLD-CCNC: 78 U/L
ALT SERPL-CCNC: 16 U/L
ANION GAP SERPL CALC-SCNC: 14 MMOL/L
AST SERPL-CCNC: 13 U/L
BILIRUB SERPL-MCNC: 0.4 MG/DL
BUN SERPL-MCNC: 29 MG/DL
CALCIUM SERPL-MCNC: 9.8 MG/DL
CHLORIDE SERPL-SCNC: 103 MMOL/L
CHOLEST SERPL-MCNC: 125 MG/DL
CO2 SERPL-SCNC: 24 MMOL/L
CREAT SERPL-MCNC: 0.77 MG/DL
EGFR: 79 ML/MIN/1.73M2
ESTIMATED AVERAGE GLUCOSE: 123 MG/DL
GLUCOSE SERPL-MCNC: 90 MG/DL
HBA1C MFR BLD HPLC: 5.9 %
HDLC SERPL-MCNC: 52 MG/DL
LDLC SERPL CALC-MCNC: 46 MG/DL
NONHDLC SERPL-MCNC: 73 MG/DL
POTASSIUM SERPL-SCNC: 5 MMOL/L
PROT SERPL-MCNC: 7 G/DL
SODIUM SERPL-SCNC: 140 MMOL/L
TRIGL SERPL-MCNC: 136 MG/DL

## 2022-12-05 NOTE — REASON FOR VISIT
[FreeTextEntry1] : 78 yo w CAD 9 s/p PCI 1/22), hyperlipidemia, HTn\par  here for follow up.\par \par Feeling great - completed cardiac rehab and has continued to be more active. No CP, RIVERA or plapitations.\par Saw Dr Duffy and is up to date. To have flu shot at [facility] today.\par \par reviewed meds. Off plavix. No issues w ASA

## 2022-12-20 ENCOUNTER — APPOINTMENT (OUTPATIENT)
Dept: OBGYN | Facility: CLINIC | Age: 77
End: 2022-12-20

## 2023-01-05 NOTE — ASU DISCHARGE PLAN (ADULT/PEDIATRIC) - PROCEDURE
hysteroscopy with symphion, dilation and curettage Patient seen and examined. Ms. Ashford is a 89 year old female status post left hip IMN for left intertrochanteric hip fracture. No acute events overnight. Pain is currently well controlled with pain medication. Patient was able to walk well with PT yesterday.     Exam:  Patient Alert & Oriented x4  Dressing: Clean, Dry, Intact  Motor: Grossly intact EHL/FHL/Tibialis Anterior/Gastrocnemius  Sensory: Grossly intact superficial peroneal/deep peroneal/saphenous/sural/tibial nerves  Vascular: 2+ DP Pulse    Assessment/Plan:  Ms. Ashford is a 89 year old female status post left hip IMN for left intertrochanteric hip fracture    Plan:  - Left Lower Extremity: Weight Bearing as Tolerated  - PT/OT, Out of Bed  - Pain Control: Per Pain Protocol  - DVT Prophylaxis: Lovenox 40mg daily  - Medicine Co-Management  - Disposition: Subacute Rehab. hysteroscopy with dilation and removal of endometrial polypoid tissue with symphion device

## 2023-01-10 ENCOUNTER — RX RENEWAL (OUTPATIENT)
Age: 78
End: 2023-01-10

## 2023-02-22 NOTE — PACU DISCHARGE NOTE - AIRWAY PATENCY:
Satisfactory
Saccharomyces Boulardii 250 mg ordered. Medications cannot be crushed, chewed, broken or capsules opened.

## 2023-03-15 ENCOUNTER — APPOINTMENT (OUTPATIENT)
Dept: GASTROENTEROLOGY | Facility: CLINIC | Age: 78
End: 2023-03-15

## 2023-04-03 ENCOUNTER — APPOINTMENT (OUTPATIENT)
Dept: CARDIOLOGY | Facility: CLINIC | Age: 78
End: 2023-04-03
Payer: MEDICARE

## 2023-04-03 ENCOUNTER — NON-APPOINTMENT (OUTPATIENT)
Age: 78
End: 2023-04-03

## 2023-04-03 VITALS — HEART RATE: 83 BPM | OXYGEN SATURATION: 100 % | SYSTOLIC BLOOD PRESSURE: 123 MMHG | DIASTOLIC BLOOD PRESSURE: 65 MMHG

## 2023-04-03 PROCEDURE — 99214 OFFICE O/P EST MOD 30 MIN: CPT | Mod: 25

## 2023-04-03 PROCEDURE — 93000 ELECTROCARDIOGRAM COMPLETE: CPT

## 2023-04-03 NOTE — REASON FOR VISIT
[FreeTextEntry1] : 78 yo w hx of CAD , breast CA, HTN lipids.\par \par Here with her daughter.\par \par Feeling ' great" - trip to [place] w daughter and hgrandson. Walked a lot and felt well.\par \par She feels like she has more energy and never symtpoms.\par \par Up tp date with other needs.\par \par Off Palvix and breast chemo-proph.\par \par reviewed med list

## 2023-04-03 NOTE — ASSESSMENT
[FreeTextEntry1] : 78 yo w \par CAD - 18 mo after PCI , completed cardiac rehb and feels great. Reviewd meds - ASA, Coreg and Ramipril. Continue current dose. Stay off plavix.\par Lipids - on lipitor 80 mg ., Ordered fasting lipids to be done.\par Elevated BP - doing well.\par Prokary - due for colonoscpoy - having cosult shorlty

## 2023-04-26 ENCOUNTER — APPOINTMENT (OUTPATIENT)
Dept: GASTROENTEROLOGY | Facility: CLINIC | Age: 78
End: 2023-04-26
Payer: MEDICARE

## 2023-04-26 VITALS
OXYGEN SATURATION: 98 % | HEIGHT: 64 IN | WEIGHT: 162 LBS | DIASTOLIC BLOOD PRESSURE: 70 MMHG | SYSTOLIC BLOOD PRESSURE: 125 MMHG | BODY MASS INDEX: 27.66 KG/M2 | HEART RATE: 88 BPM

## 2023-04-26 DIAGNOSIS — Z12.11 ENCOUNTER FOR SCREENING FOR MALIGNANT NEOPLASM OF COLON: ICD-10-CM

## 2023-04-26 PROCEDURE — 99203 OFFICE O/P NEW LOW 30 MIN: CPT

## 2023-04-26 NOTE — HISTORY OF PRESENT ILLNESS
[FreeTextEntry1] : This is a pleasant 77-year-old female with history of coronary artery disease, hyperlipidemia, hypertension, presenting for colon cancer screening evaluation.  Her last colonoscopy was in 2011 with mild diverticulosis.  She denies abdominal pain, nausea or vomiting.  She denies changes in bowel habits.  She denies rectal bleeding or melena.  She denies weight loss or anemia.  She denies family history of colon cancer or colon polyps.

## 2023-04-26 NOTE — ASSESSMENT
[FreeTextEntry1] : This is a well-functioning 77-year-old female presenting for colon cancer screening evaluation.  Last colonoscopy was over 10 years ago with diverticulosis.  I explained to her the meaning of diverticulosis and possible complications of diverticulitis and diverticular bleeding.  I explained to her the risks, alternatives and benefits to a colonoscopy.  Risk including but not limited to bleeding, perforation, infection and adverse medication reaction.  Questions were answered.  She stated understanding.

## 2023-04-26 NOTE — PHYSICAL EXAM

## 2023-04-28 ENCOUNTER — LABORATORY RESULT (OUTPATIENT)
Age: 78
End: 2023-04-28

## 2023-05-05 ENCOUNTER — APPOINTMENT (OUTPATIENT)
Dept: GASTROENTEROLOGY | Facility: AMBULATORY MEDICAL SERVICES | Age: 78
End: 2023-05-05
Payer: MEDICARE

## 2023-05-05 PROCEDURE — 45378 DIAGNOSTIC COLONOSCOPY: CPT

## 2023-05-17 ENCOUNTER — NON-APPOINTMENT (OUTPATIENT)
Age: 78
End: 2023-05-17

## 2023-08-08 ENCOUNTER — APPOINTMENT (OUTPATIENT)
Dept: OBGYN | Facility: CLINIC | Age: 78
End: 2023-08-08
Payer: MEDICARE

## 2023-08-08 VITALS
DIASTOLIC BLOOD PRESSURE: 55 MMHG | HEIGHT: 64 IN | SYSTOLIC BLOOD PRESSURE: 90 MMHG | HEART RATE: 70 BPM | WEIGHT: 157 LBS | BODY MASS INDEX: 26.8 KG/M2

## 2023-08-08 DIAGNOSIS — Z01.419 ENCOUNTER FOR GYNECOLOGICAL EXAMINATION (GENERAL) (ROUTINE) W/OUT ABNORMAL FINDINGS: ICD-10-CM

## 2023-08-08 PROCEDURE — 99397 PER PM REEVAL EST PAT 65+ YR: CPT

## 2023-08-08 NOTE — HISTORY OF PRESENT ILLNESS
[Mammogramdate] : UTD [BreastSonogramDate] : UTD [PapSmeardate] : UTD [BoneDensityDate] : UTD [ColonoscopyDate] : 2023

## 2023-08-09 LAB — HPV HIGH+LOW RISK DNA PNL CVX: NOT DETECTED

## 2023-08-11 LAB — CYTOLOGY CVX/VAG DOC THIN PREP: ABNORMAL

## 2023-08-23 ENCOUNTER — OUTPATIENT (OUTPATIENT)
Dept: OUTPATIENT SERVICES | Facility: HOSPITAL | Age: 78
LOS: 1 days | End: 2023-08-23
Payer: MEDICARE

## 2023-08-23 ENCOUNTER — APPOINTMENT (OUTPATIENT)
Dept: RADIOLOGY | Facility: CLINIC | Age: 78
End: 2023-08-23
Payer: MEDICARE

## 2023-08-23 DIAGNOSIS — Z98.890 OTHER SPECIFIED POSTPROCEDURAL STATES: Chronic | ICD-10-CM

## 2023-08-23 DIAGNOSIS — Z13.820 ENCOUNTER FOR SCREENING FOR OSTEOPOROSIS: ICD-10-CM

## 2023-08-23 DIAGNOSIS — Z90.89 ACQUIRED ABSENCE OF OTHER ORGANS: Chronic | ICD-10-CM

## 2023-08-23 PROCEDURE — 77080 DXA BONE DENSITY AXIAL: CPT

## 2023-08-23 PROCEDURE — 77080 DXA BONE DENSITY AXIAL: CPT | Mod: 26

## 2023-09-18 ENCOUNTER — APPOINTMENT (OUTPATIENT)
Dept: CARDIOLOGY | Facility: CLINIC | Age: 78
End: 2023-09-18
Payer: MEDICARE

## 2023-09-18 ENCOUNTER — NON-APPOINTMENT (OUTPATIENT)
Age: 78
End: 2023-09-18

## 2023-09-18 VITALS
OXYGEN SATURATION: 98 % | BODY MASS INDEX: 26.8 KG/M2 | HEART RATE: 70 BPM | HEIGHT: 64 IN | SYSTOLIC BLOOD PRESSURE: 118 MMHG | DIASTOLIC BLOOD PRESSURE: 71 MMHG | WEIGHT: 157 LBS

## 2023-09-18 PROCEDURE — 99214 OFFICE O/P EST MOD 30 MIN: CPT | Mod: 25

## 2023-09-18 PROCEDURE — 93000 ELECTROCARDIOGRAM COMPLETE: CPT

## 2023-12-08 NOTE — ASSESSMENT
----- Message from Rosina Daly NP sent at 12/8/2023  9:42 AM CST -----  Everything looks great except D remains very low.   Suggest adding Ergocalciferol 59764 IU weekly x 6 months in addition to current supplements.   Print for review at upcoming appointment.   Add LFT if able.    [FreeTextEntry1] : 75 yo w hx of HTN, hyperlipidemia and recent symmptoms of VALENTIN.\par \par She has a very significant family hx for premature CVD and is here to transfer care from her present cardiologist/internist.\par \par HTN - currently taking BBL and ACEI. Suggested she purchase OMRON 3 or 5 and monitor BP for three weeks. Reviewed proper time and manner to take BP. Will call to review log.\par \par Lipids - On atorvastatin 40. Valentin not have lab results prior to starting meds. Has not had CAC or CTA. Had stres many many years ago. Will continue for now. Check fasting labs and she will get copy of earlier labs from previous MD.\par \par VALENTIN - shes brings an ecg from last visit in 9/21 and I am concerned that leads were not place appropriately.\par She will obtain vanessa of older ECG. In setting of VALENTIN will schedule stress test and echo to exclude VHD, CM, IHD.\par \par Recommended pt see Dr Duffy for IM follow up

## 2024-01-02 DIAGNOSIS — Z13.71 ENCOUNTER FOR NONPROCREATIVE SCREENING FOR GENETIC DISEASE CARRIER STATUS: ICD-10-CM

## 2024-01-02 DIAGNOSIS — Z13.820 ENCOUNTER FOR SCREENING FOR OSTEOPOROSIS: ICD-10-CM

## 2024-01-02 DIAGNOSIS — Z12.11 ENCOUNTER FOR SCREENING FOR MALIGNANT NEOPLASM OF COLON: ICD-10-CM

## 2024-01-03 ENCOUNTER — APPOINTMENT (OUTPATIENT)
Dept: INTERNAL MEDICINE | Facility: CLINIC | Age: 79
End: 2024-01-03
Payer: MEDICARE

## 2024-01-03 VITALS
OXYGEN SATURATION: 98 % | SYSTOLIC BLOOD PRESSURE: 97 MMHG | HEIGHT: 64 IN | HEART RATE: 65 BPM | DIASTOLIC BLOOD PRESSURE: 58 MMHG | TEMPERATURE: 98.7 F

## 2024-01-03 DIAGNOSIS — Z00.00 ENCOUNTER FOR GENERAL ADULT MEDICAL EXAMINATION W/OUT ABNORMAL FINDINGS: ICD-10-CM

## 2024-01-03 PROCEDURE — 36415 COLL VENOUS BLD VENIPUNCTURE: CPT

## 2024-01-03 PROCEDURE — G0008: CPT

## 2024-01-03 PROCEDURE — 90662 IIV NO PRSV INCREASED AG IM: CPT

## 2024-01-03 PROCEDURE — G0439: CPT

## 2024-01-03 RX ORDER — SODIUM PICOSULFATE, MAGNESIUM OXIDE, AND ANHYDROUS CITRIC ACID 10; 3.5; 12 MG/160ML; G/160ML; G/160ML
10-3.5-12 MG-GM LIQUID ORAL
Qty: 1 | Refills: 0 | Status: DISCONTINUED | COMMUNITY
Start: 2023-04-26 | End: 2024-01-03

## 2024-01-03 NOTE — HEALTH RISK ASSESSMENT
[No] : No [No falls in past year] : Patient reported no falls in the past year [0] : 2) Feeling down, depressed, or hopeless: Not at all (0) [PHQ-2 Negative - No further assessment needed] : PHQ-2 Negative - No further assessment needed [Alone] : lives alone [Fully functional (bathing, dressing, toileting, transferring, walking, feeding)] : Fully functional (bathing, dressing, toileting, transferring, walking, feeding) [Fully functional (using the telephone, shopping, preparing meals, housekeeping, doing laundry, using] : Fully functional and needs no help or supervision to perform IADLs (using the telephone, shopping, preparing meals, housekeeping, doing laundry, using transportation, managing medications and managing finances) [Never] : Never [de-identified] : walk, exercise class 2 times week [[id number]] : 0 [Retired] : retired [Graduate School] : graduate school [] :  [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

## 2024-01-03 NOTE — PHYSICAL EXAM
[Normal Sclera/Conjunctiva] : normal sclera/conjunctiva [Normal TMs] : both tympanic membranes were normal [No Lymphadenopathy] : no lymphadenopathy [Supple] : supple [Thyroid Normal, No Nodules] : the thyroid was normal and there were no nodules present [No Carotid Bruits] : no carotid bruits [Pedal Pulses Present] : the pedal pulses are present [No Edema] : there was no peripheral edema [No Extremity Clubbing/Cyanosis] : no extremity clubbing/cyanosis [No Rash] : no rash [No Focal Deficits] : no focal deficits [Normal Gait] : normal gait [Normal] : affect was normal and insight and judgment were intact [de-identified] : diffuse varicosities, bilat LE, mostly calves

## 2024-01-03 NOTE — HISTORY OF PRESENT ILLNESS
[FreeTextEntry1] : CPE [de-identified] : 79 yo F, CAD, here for annual. No specific concerns or issues. 2 years since stent. Had COVID about 5 months ago, took Paxlovid, did ok.  Did not have any respiratory vaccines this year. Gets regular mammos, now annually, had c-scope WNL. Also had endometrial polyp removed (2021, benign path) and recent pap smear.

## 2024-01-04 LAB
ALBUMIN SERPL ELPH-MCNC: 4.5 G/DL
ALP BLD-CCNC: 72 U/L
ALT SERPL-CCNC: 15 U/L
ANION GAP SERPL CALC-SCNC: 11 MMOL/L
AST SERPL-CCNC: 17 U/L
BILIRUB SERPL-MCNC: 0.6 MG/DL
BUN SERPL-MCNC: 22 MG/DL
CALCIUM SERPL-MCNC: 9.6 MG/DL
CHLORIDE SERPL-SCNC: 105 MMOL/L
CHOLEST SERPL-MCNC: 110 MG/DL
CO2 SERPL-SCNC: 24 MMOL/L
CREAT SERPL-MCNC: 0.89 MG/DL
EGFR: 66 ML/MIN/1.73M2
ESTIMATED AVERAGE GLUCOSE: 120 MG/DL
GLUCOSE SERPL-MCNC: 97 MG/DL
HBA1C MFR BLD HPLC: 5.8 %
HDLC SERPL-MCNC: 47 MG/DL
LDLC SERPL CALC-MCNC: 47 MG/DL
NONHDLC SERPL-MCNC: 63 MG/DL
POTASSIUM SERPL-SCNC: 4.8 MMOL/L
PROT SERPL-MCNC: 6.4 G/DL
SODIUM SERPL-SCNC: 140 MMOL/L
TRIGL SERPL-MCNC: 77 MG/DL

## 2024-01-08 ENCOUNTER — APPOINTMENT (OUTPATIENT)
Dept: CARDIOLOGY | Facility: CLINIC | Age: 79
End: 2024-01-08
Payer: MEDICARE

## 2024-01-08 ENCOUNTER — NON-APPOINTMENT (OUTPATIENT)
Age: 79
End: 2024-01-08

## 2024-01-08 VITALS
HEART RATE: 66 BPM | HEIGHT: 64 IN | DIASTOLIC BLOOD PRESSURE: 71 MMHG | SYSTOLIC BLOOD PRESSURE: 127 MMHG | BODY MASS INDEX: 26.8 KG/M2 | OXYGEN SATURATION: 99 % | WEIGHT: 157 LBS

## 2024-01-08 DIAGNOSIS — I25.10 ATHEROSCLEROTIC HEART DISEASE OF NATIVE CORONARY ARTERY W/OUT ANGINA PECTORIS: ICD-10-CM

## 2024-01-08 PROCEDURE — 93000 ELECTROCARDIOGRAM COMPLETE: CPT

## 2024-01-08 PROCEDURE — 99214 OFFICE O/P EST MOD 30 MIN: CPT | Mod: 25

## 2024-01-09 PROBLEM — I25.10 CORONARY ARTERY DISEASE: Status: ACTIVE | Noted: 2022-01-28

## 2024-01-25 ENCOUNTER — RX RENEWAL (OUTPATIENT)
Age: 79
End: 2024-01-25

## 2024-01-29 NOTE — ASU PREOP CHECKLIST - NS PREOP CHK HIBICLENS NA
SCRIBE #1 NOTE: I, Franklin Carlson, am scribing for, and in the presence of, Celina Hood MD. I have scribed the entire note.       History     Chief Complaint   Patient presents with    Shortness of Breath     Shortness of breath started 2 days ago, takes CBD gummies on the weekends, but took another type that was given to her and became tired and weak and short of breath. Also reports she feels more swollen than normal. + chest discomfort.     Review of patient's allergies indicates:   Allergen Reactions    Diazepam Palpitations     Other reaction(s): Hypotension         History of Present Illness     HPI    1/29/2024, 11:01 AM  History obtained from the patient      History of Present Illness: Juana Garcia is a 43 y.o. female patient with a PMHx of arthritis, fibromyalgia, GERD, HTN, and PONV who presents to the Emergency Department for evaluation of SOB which onset 3 days PTA after eating a THC gummy from her brother-in-law. She notes that her sxs worsen with exertion. Associated sxs include fatigue, weakness, confusion, CP, chest tightness, BLE swelling, and an intermittent HA. Patient denies any fever, cough, weight changes, and all other sxs at this time. No prior Tx. She notes that she just started taking Fluvoxamine again 3 days PTA. She denies any smoking. No further complaints or concerns at this time.       Arrival mode: Personal vehicle     PCP: Amanda Lopes MD        Past Medical History:  Past Medical History:   Diagnosis Date    Arthritis     Fibromyalgia     GERD (gastroesophageal reflux disease)     HTN (hypertension) 1/4/2021    PONV (postoperative nausea and vomiting)        Past Surgical History:  Past Surgical History:   Procedure Laterality Date    CLEFT LIP REPAIR      x 6    COLONOSCOPY N/A 10/22/2020    Procedure: COLONOSCOPY;  Surgeon: Orlin Fisher MD;  Location: [facility];  Service: Endoscopy;  Laterality: N/A;    COSMETIC SURGERY      cleft lip repair     ESOPHAGOGASTRODUODENOSCOPY      ESOPHAGOGASTRODUODENOSCOPY N/A 10/22/2020    Procedure: EGD (ESOPHAGOGASTRODUODENOSCOPY);  Surgeon: Orlin Fisher MD;  Location: [facility];  Service: Endoscopy;  Laterality: N/A;    HAND SURGERY      LAPAROSCOPIC OCCLUSION OF OVIDUCTS BY DEVICE Bilateral 11/7/2018    Procedure: OCCLUSION, FALLOPIAN TUBE, LAPAROSCOPIC, USING DEVICE;  Surgeon: Alayna Arechiga MD;  Location: [facility] OR;  Service: OB/GYN;  Laterality: Bilateral;    REMOVAL OF INTRAUTERINE DEVICE (IUD) N/A 11/7/2018    Procedure: REMOVAL, INTRAUTERINE DEVICE;  Surgeon: Alayna Arechiga MD;  Location: [facility] OR;  Service: OB/GYN;  Laterality: N/A;    TUBAL LIGATION           Family History:  Family History   Problem Relation Age of Onset    Lung cancer Father     Coronary artery disease Paternal Grandmother     Prostate cancer Paternal Grandfather        Social History:  Social History     Tobacco Use    Smoking status: Former     Current packs/day: 0.00     Types: Cigarettes     Quit date: 6/25/2013     Years since quitting: 10.6    Smokeless tobacco: Never   Substance and Sexual Activity    Alcohol use: No    Drug use: No    Sexual activity: Not Currently        Review of Systems     Review of Systems   Constitutional:  Positive for fatigue. Negative for fever and unexpected weight change.   HENT:  Negative for sore throat.    Respiratory:  Positive for chest tightness and shortness of breath. Negative for cough.    Cardiovascular:  Positive for chest pain and leg swelling.   Gastrointestinal:  Negative for nausea.   Genitourinary:  Negative for dysuria.   Musculoskeletal:  Negative for back pain.   Skin:  Negative for rash.   Neurological:  Positive for weakness and headaches (intermittent).   Hematological:  Does not bruise/bleed easily.   Psychiatric/Behavioral:  Positive for confusion.    All other systems reviewed and are negative.     Physical Exam     Initial Vitals [01/29/24 0958]   BP Pulse Resp Temp SpO2    (!) 227/107 87 (!) 22 98.7 °F (37.1 °C) 98 %      MAP       --          Physical Exam  Nursing Notes and Vital Signs Reviewed.  Constitutional: Patient is in no acute distress. Morbidly obese.   Head: Atraumatic. Normocephalic.  Eyes: PERRL. EOM intact. Conjunctivae are not pale. No scleral icterus.  ENT: Mucous membranes are moist. Oropharynx is clear and symmetric.    Neck: Supple. Full ROM. No lymphadenopathy.  Cardiovascular: Regular rate. Regular rhythm. No murmurs, rubs, or gallops. Distal pulses are 2+ and symmetric.  Pulmonary/Chest: No respiratory distress. Clear to auscultation bilaterally. No wheezing or rales.  Abdominal: Soft and non-distended.  There is no tenderness.  No rebound, guarding, or rigidity. Good bowel sounds.  Genitourinary: No CVA tenderness  Musculoskeletal: Moves all extremities. No obvious deformities. Trace BLE edema. No calf tenderness.  Skin: Warm and dry.  Neurological:  Alert, awake, and appropriate.  Normal speech.  No acute focal neurological deficits are appreciated.  Psychiatric: Anxious. Good eye contact. Appropriate in content.     ED Course   Procedures  ED Vital Signs:  Vitals:    01/29/24 0958 01/29/24 1149 01/29/24 1200 01/29/24 1300   BP: (!) 227/107 (!) 147/88 (!) 159/88 (!) 154/87   Pulse: 87 80 85 87   Resp: (!) 22 15 18 18   Temp: 98.7 °F (37.1 °C)      TempSrc: Oral      SpO2: 98% 98% 98% 98%   Weight: 132.2 kg (291 lb 7.2 oz)          Abnormal Lab Results:  Labs Reviewed   DRUG SCREEN PANEL, URINE EMERGENCY - Abnormal; Notable for the following components:       Result Value    THC Presumptive Positive (*)     All other components within normal limits    Narrative:     Specimen Source->Urine   CBC W/ AUTO DIFFERENTIAL    Narrative:     Release to patient->Immediate   COMPREHENSIVE METABOLIC PANEL    Narrative:     Release to patient->Immediate   CK    Narrative:     Release to patient->Immediate   TROPONIN I    Narrative:     Release to patient->Immediate    B-TYPE NATRIURETIC PEPTIDE    Narrative:     Release to patient->Immediate   HIV 1 / 2 ANTIBODY    Narrative:     Release to patient->Immediate   HEPATITIS C ANTIBODY    Narrative:     Release to patient->Immediate   HEP C VIRUS HOLD SPECIMEN    Narrative:     Release to patient->Immediate        All Lab Results:  Results for orders placed or performed during the hospital encounter of 01/29/24   CBC auto differential   Result Value Ref Range    WBC 8.71 3.90 - 12.70 K/uL    RBC 4.54 4.00 - 5.40 M/uL    Hemoglobin 12.5 12.0 - 16.0 g/dL    Hematocrit 37.0 37.0 - 48.5 %    MCV 82 82 - 98 fL    MCH 27.5 27.0 - 31.0 pg    MCHC 33.8 32.0 - 36.0 g/dL    RDW 12.9 11.5 - 14.5 %    Platelets 242 150 - 450 K/uL    MPV 10.2 9.2 - 12.9 fL    Immature Granulocytes 0.5 0.0 - 0.5 %    Gran # (ANC) 5.8 1.8 - 7.7 K/uL    Immature Grans (Abs) 0.04 0.00 - 0.04 K/uL    Lymph # 2.1 1.0 - 4.8 K/uL    Mono # 0.5 0.3 - 1.0 K/uL    Eos # 0.2 0.0 - 0.5 K/uL    Baso # 0.05 0.00 - 0.20 K/uL    nRBC 0 0 /100 WBC    Gran % 66.9 38.0 - 73.0 %    Lymph % 23.8 18.0 - 48.0 %    Mono % 6.2 4.0 - 15.0 %    Eosinophil % 2.0 0.0 - 8.0 %    Basophil % 0.6 0.0 - 1.9 %    Differential Method Automated    Comprehensive metabolic panel   Result Value Ref Range    Sodium 138 136 - 145 mmol/L    Potassium 4.0 3.5 - 5.1 mmol/L    Chloride 105 95 - 110 mmol/L    CO2 23 23 - 29 mmol/L    Glucose 96 70 - 110 mg/dL    BUN 8 6 - 20 mg/dL    Creatinine 0.8 0.5 - 1.4 mg/dL    Calcium 9.1 8.7 - 10.5 mg/dL    Total Protein 7.3 6.0 - 8.4 g/dL    Albumin 3.7 3.5 - 5.2 g/dL    Total Bilirubin 0.4 0.1 - 1.0 mg/dL    Alkaline Phosphatase 100 55 - 135 U/L    AST 18 10 - 40 U/L    ALT 23 10 - 44 U/L    eGFR >60 >60 mL/min/1.73 m^2    Anion Gap 10 8 - 16 mmol/L   CPK   Result Value Ref Range    CPK 79 20 - 180 U/L   Troponin I   Result Value Ref Range    Troponin I <0.006 0.000 - 0.026 ng/mL   Brain natriuretic peptide   Result Value Ref Range    BNP 39 0 - 99 pg/mL   HIV 1/2  Ag/Ab (4th Gen)   Result Value Ref Range    HIV 1/2 Ag/Ab Negative Negative   Hepatitis C Antibody   Result Value Ref Range    Hepatitis C Ab Negative Negative   HCV Virus Hold Specimen   Result Value Ref Range    HEP C Virus Hold Specimen Hold for HCV sendout    Drug screen panel, emergency   Result Value Ref Range    Benzodiazepines Negative Negative    Methadone metabolites Negative Negative    Cocaine (Metab.) Negative Negative    Opiate Scrn, Ur Negative Negative    Barbiturate Screen, Ur Negative Negative    Amphetamine Screen, Ur Negative Negative    THC Presumptive Positive (A) Negative    Phencyclidine Negative Negative    Creatinine, Urine 31.9 15.0 - 325.0 mg/dL    Toxicology Information SEE COMMENT        Imaging Results:  Imaging Results              X-Ray Chest 1 View (Final result)  Result time 01/29/24 10:26:54      Final result by Zacarias Otoole MD (Timothy) (01/29/24 10:26:54)                   Impression:      Negative single view chest x-ray.      Electronically signed by: Zacarias Otoole MD  Date:    01/29/2024  Time:    10:26               Narrative:    EXAMINATION:  XR CHEST 1 VIEW    CLINICAL HISTORY:  Chest pain,    COMPARISON:  02/22/2022, chest.    FINDINGS:  One view.  Heart size is normal. The lung fields are clear. No acute cardiopulmonary infiltrate.                                       The EKG was ordered, reviewed, and independently interpreted by the ED provider.  Interpretation time: 9:57  Rate: 78 BPM  Rhythm: normal sinus rhythm  Interpretation: No acute ST changes. No STEMI.           The Emergency Provider reviewed the vital signs and test results, which are outlined above.     ED Discussion       12:53 PM: Reassessed pt at this time. Discussed with pt all pertinent ED information and results. Discussed pt dx and plan of tx. Gave pt all f/u and return to the ED instructions. All questions and concerns were addressed at this time. Pt expresses understanding of information and  instructions, and is comfortable with plan to discharge. Pt is stable for discharge.    I discussed with patient and/or family/caretaker that evaluation in the ED does not suggest any emergent or life threatening medical conditions requiring immediate intervention beyond what was provided in the ED, and I believe patient is safe for discharge.  Regardless, an unremarkable evaluation in the ED does not preclude the development or presence of a serious of life threatening condition. As such, patient was instructed to return immediately for any worsening or change in current symptoms.         Medical Decision Making  DDX:  1. Anxiety  2. THC side effect  3. ACS    Patient presents with mult complaints after taking an unknown CBD gummy, overall appears anxious, exam normal otherwise, vital signs are stable except htn noted, ECG reviewed and normal, lab work reviewed and troponin, CBC, kidney function and electrolytes are normal, CXR is normal.  Overall feel patient is stable for discharge, likely side effect of THC    Amount and/or Complexity of Data Reviewed  Labs: ordered. Decision-making details documented in ED Course.  Radiology: ordered. Decision-making details documented in ED Course.  ECG/medicine tests: ordered and independent interpretation performed. Decision-making details documented in ED Course.    Risk  Prescription drug management.                ED Medication(s):  Medications - No data to display    Discharge Medication List as of 1/29/2024 12:54 PM        START taking these medications    Details   hydroCHLOROthiazide (HYDRODIURIL) 12.5 MG Tab Take 1 tablet (12.5 mg total) by mouth once daily., Starting Mon 1/29/2024, Until Tue 1/28/2025, Normal              Follow-up Information       Amanda Lopes MD. Schedule an appointment as soon as possible for a visit in 2 days.    Specialty: Family Medicine  Why: Return to the Emergency Room, If symptoms worsen  Contact information:  [address]  [facility] [address]  [phone]                                 Scribe Attestation:   Scribe #1: I performed the above scribed service and the documentation accurately describes the services I performed. I attest to the accuracy of the note.     Attending:   Physician Attestation Statement for Scribe #1: I, Celina Hood MD, personally performed the services described in this documentation, as scribed by Franklin Carlson, in my presence, and it is both accurate and complete.           Clinical Impression       ICD-10-CM ICD-9-CM   1. SOB (shortness of breath)  R06.02 786.05   2. Chest pain  R07.9 786.50   3. Elevated blood pressure reading  R03.0 796.2   4. Morbid obesity  E66.01 278.01   5. Tetrahydrocannabinol (THC) use disorder, mild, abuse  F12.10 305.20   6. Anxiety  F41.9 300.00       Disposition:   Disposition: Discharged  Condition: Stable         Celina Hood MD  01/31/24 0490     N/A

## 2024-04-09 ENCOUNTER — RX RENEWAL (OUTPATIENT)
Age: 79
End: 2024-04-09

## 2024-05-13 ENCOUNTER — APPOINTMENT (OUTPATIENT)
Dept: CARDIOLOGY | Facility: CLINIC | Age: 79
End: 2024-05-13

## 2024-06-17 ENCOUNTER — APPOINTMENT (OUTPATIENT)
Dept: CARDIOLOGY | Facility: CLINIC | Age: 79
End: 2024-06-17

## 2024-06-17 VITALS
OXYGEN SATURATION: 98 % | SYSTOLIC BLOOD PRESSURE: 130 MMHG | HEART RATE: 68 BPM | HEIGHT: 64 IN | WEIGHT: 156 LBS | BODY MASS INDEX: 26.63 KG/M2 | DIASTOLIC BLOOD PRESSURE: 74 MMHG

## 2024-06-17 DIAGNOSIS — E78.5 HYPERLIPIDEMIA, UNSPECIFIED: ICD-10-CM

## 2024-06-17 DIAGNOSIS — I10 ESSENTIAL (PRIMARY) HYPERTENSION: ICD-10-CM

## 2024-06-17 DIAGNOSIS — C50.211 MALIGNANT NEOPLASM OF UPPER-INNER QUADRANT OF RIGHT FEMALE BREAST: ICD-10-CM

## 2024-06-17 PROCEDURE — 93010 ELECTROCARDIOGRAM REPORT: CPT

## 2024-06-17 PROCEDURE — 99214 OFFICE O/P EST MOD 30 MIN: CPT | Mod: 25

## 2024-06-18 PROBLEM — E78.5 HYPERLIPIDEMIA: Status: ACTIVE | Noted: 2021-11-01

## 2024-06-18 NOTE — REASON FOR VISIT
[FreeTextEntry1] : 79 yo well k now to use w hx of CAD, s/p PCI, LBBB. Here for check up.  Feeling very well - trying to remain active - no CP, RIVERA or other concerning symptoms.. Very anxious about the world's issues  Reviewed med list.

## 2024-06-18 NOTE — ASSESSMENT
[FreeTextEntry1] : 79 yo w  CAD - hx of PCI 1/22 ( she jokes about her annual " heart birthday". Will schedule stress test. pharm - in setting of LBBB. Continue current meds. LBBB - no change in today's ecg.  HTN - good control on current meds Lipids - continue high dose statin

## 2024-06-18 NOTE — PHYSICAL EXAM
[Well Developed] : well developed [Well Nourished] : well nourished [No Acute Distress] : no acute distress [Normal Conjunctiva] : normal conjunctiva [Normal Venous Pressure] : normal venous pressure [No Carotid Bruit] : no carotid bruit [Normal S1, S2] : normal S1, S2 [No Rub] : no rub [No Gallop] : no gallop [Clear Lung Fields] : clear lung fields [Good Air Entry] : good air entry [No Respiratory Distress] : no respiratory distress  [Soft] : abdomen soft [Non Tender] : non-tender [No Masses/organomegaly] : no masses/organomegaly [Normal Bowel Sounds] : normal bowel sounds [Normal Gait] : normal gait [No Edema] : no edema [No Cyanosis] : no cyanosis [No Clubbing] : no clubbing [No Varicosities] : no varicosities [No Rash] : no rash [No Skin Lesions] : no skin lesions [Moves all extremities] : moves all extremities [No Focal Deficits] : no focal deficits [Normal Speech] : normal speech [Alert and Oriented] : alert and oriented [Normal memory] : normal memory [de-identified] : soft HSM

## 2024-06-25 NOTE — ASSESSMENT
General Question     Subject: SAME DAY APPT  Patient and /or Facility Request: Savita Kohler   Contact Number: [phone]     PT CLLED TO REQ A SAME DAY APPT FOR TODAY IN [place] SENT A TEAMS    [FreeTextEntry1] : 76 yo w \par CAD - alomost one year s/p PCIs. Did well in cardiac rehab. Reviewed meds. \par HTN - good control on current mreds. \par Lipids - LDL acceptable on max dose lipitor

## 2024-06-26 ENCOUNTER — APPOINTMENT (OUTPATIENT)
Dept: CV DIAGNOSTICS | Facility: HOSPITAL | Age: 79
End: 2024-06-26

## 2024-06-26 ENCOUNTER — OUTPATIENT (OUTPATIENT)
Dept: OUTPATIENT SERVICES | Facility: HOSPITAL | Age: 79
LOS: 1 days | End: 2024-06-26
Payer: MEDICARE

## 2024-06-26 ENCOUNTER — RESULT REVIEW (OUTPATIENT)
Age: 79
End: 2024-06-26

## 2024-06-26 DIAGNOSIS — Z90.89 ACQUIRED ABSENCE OF OTHER ORGANS: Chronic | ICD-10-CM

## 2024-06-26 DIAGNOSIS — Z98.890 OTHER SPECIFIED POSTPROCEDURAL STATES: Chronic | ICD-10-CM

## 2024-06-26 DIAGNOSIS — I25.10 ATHEROSCLEROTIC HEART DISEASE OF NATIVE CORONARY ARTERY WITHOUT ANGINA PECTORIS: ICD-10-CM

## 2024-06-26 PROCEDURE — 93016 CV STRESS TEST SUPVJ ONLY: CPT | Mod: MC

## 2024-06-26 PROCEDURE — 78452 HT MUSCLE IMAGE SPECT MULT: CPT | Mod: 26,MC

## 2024-06-26 PROCEDURE — 93018 CV STRESS TEST I&R ONLY: CPT | Mod: MC

## 2024-06-26 PROCEDURE — 78452 HT MUSCLE IMAGE SPECT MULT: CPT | Mod: MC

## 2024-06-26 PROCEDURE — A9500: CPT

## 2024-06-26 PROCEDURE — 93017 CV STRESS TEST TRACING ONLY: CPT

## 2024-08-22 ENCOUNTER — APPOINTMENT (OUTPATIENT)
Dept: CARDIOLOGY | Facility: CLINIC | Age: 79
End: 2024-08-22

## 2024-09-09 ENCOUNTER — APPOINTMENT (OUTPATIENT)
Dept: CARDIOLOGY | Facility: CLINIC | Age: 79
End: 2024-09-09
Payer: MEDICARE

## 2024-09-09 ENCOUNTER — NON-APPOINTMENT (OUTPATIENT)
Age: 79
End: 2024-09-09

## 2024-09-09 VITALS
OXYGEN SATURATION: 98 % | SYSTOLIC BLOOD PRESSURE: 132 MMHG | HEART RATE: 71 BPM | DIASTOLIC BLOOD PRESSURE: 69 MMHG | WEIGHT: 157 LBS | BODY MASS INDEX: 26.95 KG/M2

## 2024-09-09 DIAGNOSIS — C50.211 MALIGNANT NEOPLASM OF UPPER-INNER QUADRANT OF RIGHT FEMALE BREAST: ICD-10-CM

## 2024-09-09 DIAGNOSIS — E78.5 HYPERLIPIDEMIA, UNSPECIFIED: ICD-10-CM

## 2024-09-09 DIAGNOSIS — I25.10 ATHEROSCLEROTIC HEART DISEASE OF NATIVE CORONARY ARTERY W/OUT ANGINA PECTORIS: ICD-10-CM

## 2024-09-09 DIAGNOSIS — I10 ESSENTIAL (PRIMARY) HYPERTENSION: ICD-10-CM

## 2024-09-09 PROCEDURE — 99214 OFFICE O/P EST MOD 30 MIN: CPT | Mod: 25

## 2024-09-09 PROCEDURE — 93000 ELECTROCARDIOGRAM COMPLETE: CPT

## 2024-09-09 NOTE — REASON FOR VISIT
[FreeTextEntry1] : 77 yo well k now to use w hx of CAD, s/p PCI, LBBB. Here for check up.  Feeling very well - trying to remain active - no CP, RIVERA or other concerning symptoms.. Very anxious about the world's issues  Reviewed med list.  Interval Note September 9, 2024  Ms. Chi returns for a cardiovascular follow up. She carries a medical history as outlined below:  -Hx of Hypertension -Hx of Hyperlipidemia -Hx of CAD -Hx of PCI -Hx of LBBB Blood pressure today: 132/ 69  EKG   Feeling well - re-reviewed stres results w no inducible ischemia . Only concern is that she sometimes feels. tired and a bit down. She knows the world issues are probably responsible. She is remaining active - walks regularly. No symptoms of CP, RIVERA

## 2024-09-09 NOTE — PHYSICAL EXAM
[Well Developed] : well developed [Well Nourished] : well nourished [No Acute Distress] : no acute distress [Normal Conjunctiva] : normal conjunctiva [Normal Venous Pressure] : normal venous pressure [No Carotid Bruit] : no carotid bruit [Normal S1, S2] : normal S1, S2 [No Rub] : no rub [No Gallop] : no gallop [Clear Lung Fields] : clear lung fields [Good Air Entry] : good air entry [No Respiratory Distress] : no respiratory distress  [Soft] : abdomen soft [No Masses/organomegaly] : no masses/organomegaly [Non Tender] : non-tender [Normal Bowel Sounds] : normal bowel sounds [Normal Gait] : normal gait [No Edema] : no edema [No Cyanosis] : no cyanosis [No Clubbing] : no clubbing [No Varicosities] : no varicosities [No Rash] : no rash [No Skin Lesions] : no skin lesions [Moves all extremities] : moves all extremities [No Focal Deficits] : no focal deficits [Normal Speech] : normal speech [Alert and Oriented] : alert and oriented [Normal memory] : normal memory [de-identified] : soft HSM

## 2024-09-09 NOTE — PHYSICAL EXAM
[Well Developed] : well developed [Well Nourished] : well nourished [No Acute Distress] : no acute distress [Normal Conjunctiva] : normal conjunctiva [Normal Venous Pressure] : normal venous pressure [No Carotid Bruit] : no carotid bruit [Normal S1, S2] : normal S1, S2 [No Rub] : no rub [No Gallop] : no gallop [Clear Lung Fields] : clear lung fields [Good Air Entry] : good air entry [No Respiratory Distress] : no respiratory distress  [Soft] : abdomen soft [No Masses/organomegaly] : no masses/organomegaly [Non Tender] : non-tender [Normal Bowel Sounds] : normal bowel sounds [Normal Gait] : normal gait [No Edema] : no edema [No Cyanosis] : no cyanosis [No Clubbing] : no clubbing [No Varicosities] : no varicosities [No Rash] : no rash [No Skin Lesions] : no skin lesions [Moves all extremities] : moves all extremities [No Focal Deficits] : no focal deficits [Normal Speech] : normal speech [Alert and Oriented] : alert and oriented [Normal memory] : normal memory [de-identified] : soft HSM

## 2024-09-09 NOTE — ASSESSMENT
[FreeTextEntry1] : 79 yo w CAD - recent nuke stress w no ischemia. continue currrent meds LBBB- no etiology that would  - excluded CM, IHD. HYN - doing well LIpids - at goal . labs ordered today

## 2024-09-09 NOTE — ASSESSMENT
[FreeTextEntry1] : 77 yo w CAD - recent nuke stress w no ischemia. continue currrent meds LBBB- no etiology that would  - excluded CM, IHD. HYN - doing well LIpids - at goal . labs ordered today

## 2024-09-12 ENCOUNTER — NON-APPOINTMENT (OUTPATIENT)
Age: 79
End: 2024-09-12

## 2024-09-12 LAB
ALBUMIN SERPL ELPH-MCNC: 4.6 G/DL
ALP BLD-CCNC: 73 U/L
ALT SERPL-CCNC: 16 U/L
ANION GAP SERPL CALC-SCNC: 11 MMOL/L
AST SERPL-CCNC: 20 U/L
BILIRUB SERPL-MCNC: 0.8 MG/DL
BUN SERPL-MCNC: 26 MG/DL
CALCIUM SERPL-MCNC: 9.8 MG/DL
CHLORIDE SERPL-SCNC: 108 MMOL/L
CHOLEST SERPL-MCNC: 125 MG/DL
CO2 SERPL-SCNC: 24 MMOL/L
CREAT SERPL-MCNC: 0.76 MG/DL
EGFR: 79 ML/MIN/1.73M2
ESTIMATED AVERAGE GLUCOSE: 120 MG/DL
GLUCOSE SERPL-MCNC: 100 MG/DL
HBA1C MFR BLD HPLC: 5.8 %
HCT VFR BLD CALC: 38.1 %
HDLC SERPL-MCNC: 44 MG/DL
HGB BLD-MCNC: 12.4 G/DL
LDLC SERPL CALC-MCNC: 63 MG/DL
MCHC RBC-ENTMCNC: 28.5 PG
MCHC RBC-ENTMCNC: 32.5 GM/DL
MCV RBC AUTO: 87.6 FL
NONHDLC SERPL-MCNC: 81 MG/DL
PLATELET # BLD AUTO: 285 K/UL
POTASSIUM SERPL-SCNC: 4.7 MMOL/L
PROT SERPL-MCNC: 6.8 G/DL
RBC # BLD: 4.35 M/UL
RBC # FLD: 13.7 %
SODIUM SERPL-SCNC: 143 MMOL/L
TRIGL SERPL-MCNC: 97 MG/DL
WBC # FLD AUTO: 6.97 K/UL

## 2024-09-27 ENCOUNTER — RX RENEWAL (OUTPATIENT)
Age: 79
End: 2024-09-27

## 2024-10-01 ENCOUNTER — RX RENEWAL (OUTPATIENT)
Age: 79
End: 2024-10-01

## 2024-10-01 RX ORDER — RAMIPRIL 10 MG/1
10 CAPSULE ORAL
Qty: 180 | Refills: 1 | Status: ACTIVE | COMMUNITY
Start: 2024-10-01 | End: 1900-01-01

## 2024-11-08 ENCOUNTER — RX RENEWAL (OUTPATIENT)
Age: 79
End: 2024-11-08

## 2024-12-30 ENCOUNTER — RX RENEWAL (OUTPATIENT)
Age: 79
End: 2024-12-30

## 2025-01-02 ENCOUNTER — RX RENEWAL (OUTPATIENT)
Age: 80
End: 2025-01-02

## 2025-01-03 RX ORDER — CARVEDILOL 6.25 MG/1
6.25 TABLET, FILM COATED ORAL
Qty: 180 | Refills: 3 | Status: ACTIVE | COMMUNITY
Start: 2025-01-02 | End: 1900-01-01

## 2025-01-13 ENCOUNTER — APPOINTMENT (OUTPATIENT)
Dept: CARDIOLOGY | Facility: CLINIC | Age: 80
End: 2025-01-13
Payer: MEDICARE

## 2025-01-13 ENCOUNTER — NON-APPOINTMENT (OUTPATIENT)
Age: 80
End: 2025-01-13

## 2025-01-13 VITALS
HEART RATE: 64 BPM | DIASTOLIC BLOOD PRESSURE: 70 MMHG | SYSTOLIC BLOOD PRESSURE: 125 MMHG | WEIGHT: 152 LBS | OXYGEN SATURATION: 100 % | BODY MASS INDEX: 26.09 KG/M2

## 2025-01-13 DIAGNOSIS — I10 ESSENTIAL (PRIMARY) HYPERTENSION: ICD-10-CM

## 2025-01-13 DIAGNOSIS — C50.211 MALIGNANT NEOPLASM OF UPPER-INNER QUADRANT OF RIGHT FEMALE BREAST: ICD-10-CM

## 2025-01-13 DIAGNOSIS — E78.5 HYPERLIPIDEMIA, UNSPECIFIED: ICD-10-CM

## 2025-01-13 DIAGNOSIS — I25.10 ATHEROSCLEROTIC HEART DISEASE OF NATIVE CORONARY ARTERY W/OUT ANGINA PECTORIS: ICD-10-CM

## 2025-01-13 PROCEDURE — 93000 ELECTROCARDIOGRAM COMPLETE: CPT

## 2025-01-13 PROCEDURE — 99214 OFFICE O/P EST MOD 30 MIN: CPT | Mod: 25

## 2025-03-07 NOTE — ASU PREOP CHECKLIST - TAMPON REMOVED
[facility] [place]     \" Patient would like you to send refills for loeazepam 0.5 mg tablets to the [facility] in [facility]. She already had 3 tab filled at another [facility] but we need new rx to fill the remainder. Thanks.\"     No protocol for requested medication.    Medication:    Disp Refills Start End    LORazepam (ATIVAN) 0.5 MG tablet 60 tablet 0 2/4/2025 --    Sig - Route: Take 1 tablet by mouth 2 times daily as needed for Anxiety. - Oral    Sent to pharmacy as: LORazepam 0.5 MG Oral Tablet (ATIVAN)    Class: Eprescribe        Last office visit date: 2/18/25-skin   LAST CPE: NONE   Next ov: none   Pharmacy: [id number] - [place], [place] - [address] AT [facility] OF [address]    Order pended, routed to clinician for review.      n/a

## 2025-04-28 ENCOUNTER — APPOINTMENT (OUTPATIENT)
Dept: CARDIOLOGY | Facility: CLINIC | Age: 80
End: 2025-04-28
Payer: MEDICARE

## 2025-04-28 VITALS
OXYGEN SATURATION: 100 % | DIASTOLIC BLOOD PRESSURE: 67 MMHG | BODY MASS INDEX: 26.43 KG/M2 | WEIGHT: 154 LBS | SYSTOLIC BLOOD PRESSURE: 124 MMHG | HEART RATE: 76 BPM

## 2025-04-28 DIAGNOSIS — I25.10 ATHEROSCLEROTIC HEART DISEASE OF NATIVE CORONARY ARTERY W/OUT ANGINA PECTORIS: ICD-10-CM

## 2025-04-28 DIAGNOSIS — I10 ESSENTIAL (PRIMARY) HYPERTENSION: ICD-10-CM

## 2025-04-28 DIAGNOSIS — E78.5 HYPERLIPIDEMIA, UNSPECIFIED: ICD-10-CM

## 2025-04-28 PROCEDURE — 93000 ELECTROCARDIOGRAM COMPLETE: CPT

## 2025-04-28 PROCEDURE — 99214 OFFICE O/P EST MOD 30 MIN: CPT | Mod: 25

## 2025-09-15 ENCOUNTER — RX RENEWAL (OUTPATIENT)
Age: 80
End: 2025-09-15

## 2025-09-17 ENCOUNTER — RX RENEWAL (OUTPATIENT)
Age: 80
End: 2025-09-17